# Patient Record
Sex: FEMALE | Race: WHITE | NOT HISPANIC OR LATINO | Employment: FULL TIME | URBAN - METROPOLITAN AREA
[De-identification: names, ages, dates, MRNs, and addresses within clinical notes are randomized per-mention and may not be internally consistent; named-entity substitution may affect disease eponyms.]

---

## 2018-02-13 ENCOUNTER — OFFICE VISIT (OUTPATIENT)
Dept: OBSTETRICS AND GYNECOLOGY | Age: 48
End: 2018-02-13

## 2018-02-13 VITALS
DIASTOLIC BLOOD PRESSURE: 72 MMHG | BODY MASS INDEX: 31.45 KG/M2 | HEIGHT: 59 IN | WEIGHT: 156 LBS | SYSTOLIC BLOOD PRESSURE: 120 MMHG

## 2018-02-13 DIAGNOSIS — Z01.419 ENCOUNTER FOR GYNECOLOGICAL EXAMINATION: Primary | ICD-10-CM

## 2018-02-13 DIAGNOSIS — F41.9 ANXIETY: ICD-10-CM

## 2018-02-13 DIAGNOSIS — Z98.890 S/P ENDOMETRIAL ABLATION: ICD-10-CM

## 2018-02-13 PROCEDURE — 99396 PREV VISIT EST AGE 40-64: CPT | Performed by: OBSTETRICS & GYNECOLOGY

## 2018-02-13 NOTE — PROGRESS NOTES
"Subjective     Chief Complaint   Patient presents with   • Gynecologic Exam     Annual:mammo here today         History of Present Illness      Heidy Tesfaye is a very pleasant  47 y.o. female who presents for annual exam.  Mammo Exam Today, Contraception none, Exercise 3 times a week    Patient has a history of anxiety but she's been off her medications for a while. She is under a little increased stress because she recently unexpectedly lost her mother after some complications of heart surgery. She is still exercising regularly working full-time and very active in her daughters lives. She is getting mammogram Pap smear and pelvic and breast exam here today.      Obstetric History:  OB History      Para Term  AB Living    2 2 2       SAB TAB Ectopic Multiple Live Births                 Menstrual History:     Patient's last menstrual period was 2018.       Sexual History:       Past Medical History:   Diagnosis Date   • Hyperglycemia    • Irregular menses    • LGSIL on Pap smear of cervix     1998     Past Surgical History:   Procedure Laterality Date   • CRYOTHERAPY     • ENDOMETRIAL ABLATION         SOCIAL Hx:      The following portions of the patient's history were reviewed and updated as appropriate: allergies, current medications, past family history, past medical history, past social history, past surgical history and problem list.    Review of Systems        Except as outlined in history of physical illness, patient denies any changes in her GYN, , GI systems.  All other systems reviewed were negative.         Objective   Physical Exam    /72  Ht 149.9 cm (59\")  Wt 70.8 kg (156 lb)  LMP 2018  BMI 31.51 kg/m2    General: Patient is alert and oriented and appears overall healthy  Neck: Is supple without thyromegaly, no carotid bruits and no lymphadenopathy  Lungs: Clear bilaterally, no wheezing, rhonchi, or rales.  Respiratory rate is normal  Breast: Even, " symmetrical, no lymphadenopathy, no retraction, no masses or cysts  Heart: Regular rate and rhythm are appreciated, no murmurs or rubs are heard  Abdomen: Is soft, without organomegaly, bowel sounds are positive, there is no                                rebound or guarding and palpation does not produce any discomfort  Back: Nontender without CVA tenderness  Pelvic: External genitalia appear normal and consistent with mature female.  BUS normal                            Vagina is clean dry without discharge and appears adequately estrogenized, no               lesions or masses are present                         Cervix is noninflamed without discharge or lesions.  There is no cervical motion             tenderness.                Uterus is nonenlarged, without tenderness, and no masses or abnormalities are  present               Adnexa are non-enlarged, non tender               Rectal exam reveals adequate sphincter tone and no masses or lesions are                     appreciated on digital rectal examination.    Patient Active Problem List   Diagnosis   • Vitamin D deficiency, unspecified   • S/P endometrial ablation   • Anxiety                 Assessment/Plan   Heidy was seen today for gynecologic exam.    Diagnoses and all orders for this visit:    Encounter for gynecological examination  -     IGP, Apt HPV,rfx 16 / 18,45 - ThinPrep Vial, Cervix    S/P endometrial ablation    Anxiety  Patient wants to stay off daily medicine but has been significantly sleep deprived since her mother's death. We're getting to give her a couple weeks worth of Ambien 5 mg to help sleep she'll take those once or twice a week.      Annual Well Woman Exam    Discussed today's findings and concerns with patient in detail.  Continue to recommend regular exercise to include cardiovascular and resistance training and breast self-exam. Wellness lab, mammography, and pap smear, in accordance with age guidelines.  Nutritional   recommendations  were discussed.    All of the patient's questions were addressed and answered, I have encouraged her to call for today's test results if she has not received them within 10 days.  Patient is advised to call with any change in her condition or with any other questions, otherwise return in 12 months for annual examination.

## 2018-02-15 LAB
CYTOLOGIST CVX/VAG CYTO: NORMAL
CYTOLOGY CVX/VAG DOC THIN PREP: NORMAL
DX ICD CODE: NORMAL
HIV 1 & 2 AB SER-IMP: NORMAL
HPV I/H RISK 4 DNA CVX QL PROBE+SIG AMP: NEGATIVE
OTHER STN SPEC: NORMAL
PATH REPORT.FINAL DX SPEC: NORMAL
STAT OF ADQ CVX/VAG CYTO-IMP: NORMAL

## 2019-02-19 ENCOUNTER — APPOINTMENT (OUTPATIENT)
Dept: WOMENS IMAGING | Facility: HOSPITAL | Age: 49
End: 2019-02-19

## 2019-02-19 ENCOUNTER — PROCEDURE VISIT (OUTPATIENT)
Dept: OBSTETRICS AND GYNECOLOGY | Age: 49
End: 2019-02-19

## 2019-02-19 ENCOUNTER — OFFICE VISIT (OUTPATIENT)
Dept: OBSTETRICS AND GYNECOLOGY | Age: 49
End: 2019-02-19

## 2019-02-19 VITALS
SYSTOLIC BLOOD PRESSURE: 124 MMHG | WEIGHT: 161 LBS | BODY MASS INDEX: 31.61 KG/M2 | HEIGHT: 60 IN | DIASTOLIC BLOOD PRESSURE: 70 MMHG

## 2019-02-19 DIAGNOSIS — Z98.890 S/P ENDOMETRIAL ABLATION: ICD-10-CM

## 2019-02-19 DIAGNOSIS — N95.1 PERIMENOPAUSAL: ICD-10-CM

## 2019-02-19 DIAGNOSIS — Z12.31 VISIT FOR SCREENING MAMMOGRAM: Primary | ICD-10-CM

## 2019-02-19 DIAGNOSIS — Z01.419 ENCOUNTER FOR GYNECOLOGICAL EXAMINATION: Primary | ICD-10-CM

## 2019-02-19 PROCEDURE — 99396 PREV VISIT EST AGE 40-64: CPT | Performed by: OBSTETRICS & GYNECOLOGY

## 2019-02-19 PROCEDURE — 77067 SCR MAMMO BI INCL CAD: CPT | Performed by: OBSTETRICS & GYNECOLOGY

## 2019-02-19 PROCEDURE — 77067 SCR MAMMO BI INCL CAD: CPT | Performed by: RADIOLOGY

## 2019-02-19 NOTE — PROGRESS NOTES
"Subjective     Chief Complaint   Patient presents with   • Gynecologic Exam     Annual:last pap 2018         History of Present Illness      Heidy Tesfaye is a very pleasant  48 y.o. female who presents for annual exam.  Mammo Exam today, Contraception none, Exercise 5 times a week  Patient had an endometrial ablation, her cycles are 50% less than what they used to be but still 2-3 days.  No gynecological concerns or complaints.  She has had a little bit better year she is doing better emotionally after the loss of her mother.  Family life and work is still stressful.  She is a non-smoker.  She is receiving mammogram and Pap smear today..      Obstetric History:  OB History      Para Term  AB Living    2 2 2          SAB TAB Ectopic Molar Multiple Live Births                        Menstrual History:     Patient's last menstrual period was 2019 (approximate).       Sexual History:       Past Medical History:   Diagnosis Date   • Hyperglycemia    • Irregular menses    • LGSIL on Pap smear of cervix     1998     Past Surgical History:   Procedure Laterality Date   • CRYOTHERAPY     • ENDOMETRIAL ABLATION         SOCIAL Hx:      The following portions of the patient's history were reviewed and updated as appropriate: allergies, current medications, past family history, past medical history, past social history, past surgical history and problem list.    Review of Systems        Except as outlined in history of physical illness, patient denies any changes in her GYN, , GI systems.  All other systems reviewed were negative.         Objective   Physical Exam    /70   Ht 152.4 cm (60\")   Wt 73 kg (161 lb)   LMP 2019 (Approximate)   BMI 31.44 kg/m²     General: Patient is alert and oriented and appears overall healthy  Neck: Is supple without thyromegaly, no carotid bruits and no lymphadenopathy  Lungs: Clear bilaterally, no wheezing, rhonchi, or rales.  Respiratory rate is " normal  Breast: Even, symmetrical, no lymphadenopathy, no retraction, no masses or cysts  Heart: Regular rate and rhythm are appreciated, no murmurs or rubs are heard  Abdomen: Is soft, without organomegaly, bowel sounds are positive, there is no                                rebound or guarding and palpation does not produce any discomfort  Back: Nontender without CVA tenderness  Pelvic: External genitalia appear normal and consistent with mature female.  BUS normal                            Vagina is clean dry without discharge and appears adequately estrogenized, no               lesions or masses are present                         Cervix is noninflamed without discharge or lesions.  There is no cervical motion             tenderness.                Uterus is nonenlarged, without tenderness, and no masses or abnormalities are  present               Adnexa are non-enlarged, non tender               Rectal exam reveals adequate sphincter tone and no masses or lesions are                     appreciated on digital rectal examination.      Annual Well Woman Exam  Patient Active Problem List   Diagnosis   • Vitamin D deficiency, unspecified   • S/P endometrial ablation   • Anxiety                 Assessment/Plan   Heidy was seen today for gynecologic exam.    Diagnoses and all orders for this visit:    Encounter for gynecological examination  -     IGP, Apt HPV,rfx 16 / 18,45    Perimenopausal    S/P endometrial ablation    Patient would like to have wellness labs next year        Discussed today's findings and concerns with patient in detail.  Continue to recommend regular exercise to include cardiovascular and resistance training and breast self-exam. Wellness lab, mammography, and pap smear, in accordance with age guidelines.  Nutritional  recommendations  were discussed.    All of the patient's questions were addressed and answered, I have encouraged her to call for today's test results if she has not received  them within 10 days.  Patient is advised to call with any change in her condition or with any other questions, otherwise return in 12 months for annual examination.

## 2019-06-10 ENCOUNTER — TELEPHONE (OUTPATIENT)
Dept: OBSTETRICS AND GYNECOLOGY | Age: 49
End: 2019-06-10

## 2019-06-10 NOTE — TELEPHONE ENCOUNTER
Please advise    Notify patient that I have sent some medication to the pharmacy that she should take for 10 days and this might help with the sweats

## 2019-06-10 NOTE — TELEPHONE ENCOUNTER
Dr Rome pt has been experiencing really bad day and night sweats since her Feb visit. Pt wants to know what she should do. Pt will be traveling to [place] this Sat and doesn't want to be sweating all day if there is something she can do. Please Advise

## 2019-06-14 ENCOUNTER — TELEPHONE (OUTPATIENT)
Dept: OBSTETRICS AND GYNECOLOGY | Age: 49
End: 2019-06-14

## 2019-06-14 NOTE — TELEPHONE ENCOUNTER
SPOKE TO PT, SHE WAS WONDERING IF THERE WAS ANYTHING OTC SHE CAN TAKE TO HELP WITH THE DAY/NIGHT SWEATS UNTIL MED STARTS TAKING EFFECT.

## 2019-06-14 NOTE — TELEPHONE ENCOUNTER
Take for 10 days as advised by Dr. Rome. This medication was just prescribed 4 days ago- may take more time before she notices benefits. If no improvement in symptoms she may consider f/u with Dr. Rome.

## 2019-06-14 NOTE — TELEPHONE ENCOUNTER
PT CALLING, CONFUSED ON HOW TO TAKE PROGESTERONE. DR ANDRADE TOLD HER IT WOULD BE 10 PILLS BUT THE BOTTLE HAS 14 PILLS. STILL NO RELIEF WITH THE DAY OR NIGHT SWEATS. GETTING READY TO GO OUT OF TOWN FOR THE NEXT 10 DAYS. [phone]

## 2020-02-25 ENCOUNTER — OFFICE VISIT (OUTPATIENT)
Dept: OBSTETRICS AND GYNECOLOGY | Age: 50
End: 2020-02-25

## 2020-02-25 VITALS
DIASTOLIC BLOOD PRESSURE: 78 MMHG | HEIGHT: 60 IN | BODY MASS INDEX: 31.61 KG/M2 | WEIGHT: 161 LBS | SYSTOLIC BLOOD PRESSURE: 124 MMHG

## 2020-02-25 DIAGNOSIS — N95.1 HOT FLASHES DUE TO MENOPAUSE: ICD-10-CM

## 2020-02-25 DIAGNOSIS — Z12.4 PAP SMEAR FOR CERVICAL CANCER SCREENING: ICD-10-CM

## 2020-02-25 DIAGNOSIS — Z01.419 WELL FEMALE EXAM WITH ROUTINE GYNECOLOGICAL EXAM: ICD-10-CM

## 2020-02-25 DIAGNOSIS — E55.9 VITAMIN D DEFICIENCY: Primary | ICD-10-CM

## 2020-02-25 DIAGNOSIS — Z71.89 COUNSELING FOR HORMONE REPLACEMENT THERAPY: ICD-10-CM

## 2020-02-25 PROCEDURE — 99396 PREV VISIT EST AGE 40-64: CPT | Performed by: OBSTETRICS & GYNECOLOGY

## 2020-02-25 NOTE — PROGRESS NOTES
"Subjective     Chief Complaint   Patient presents with   • Gynecologic Exam     Annual.  Last pap 19, negative. Last mammo 19, negative.          History of Present Illness      Heidy Tesfaye is a very pleasant  49 y.o. female who presents for annual exam.  Mammo Exam has to be rescheduled from this morning, Contraception none, Exercise 5 times a week    Patient is bothered by vasomotor changes mostly hot flashes.  She has had an endometrial ablation and wants to know if she is menopausal to decide if she wants to go on some kind of HRT    She has not had a wellness labs work and a recheck those this morning    She has had a stressful year her mom had passed, then her father-in-law recently , and they have had some family friends who were killed in accidents.  She states she is coping appropriately.      Obstetric History:  OB History        2    Para   2    Term   2            AB        Living           SAB        TAB        Ectopic        Molar        Multiple        Live Births                   Menstrual History:     Patient's last menstrual period was 2019 (approximate).       Sexual History:       Past Medical History:   Diagnosis Date   • Hyperglycemia    • Irregular menses    • LGSIL on Pap smear of cervix     1998     Past Surgical History:   Procedure Laterality Date   • CRYOTHERAPY     • ENDOMETRIAL ABLATION         SOCIAL Hx:      The following portions of the patient's history were reviewed and updated as appropriate: allergies, current medications, past family history, past medical history, past social history, past surgical history and problem list.    Review of Systems        Except as outlined in history of physical illness, patient denies any changes in her GYN, , GI systems.  All other systems reviewed were negative.         Objective   Physical Exam    /78   Ht 152.4 cm (60\")   Wt 73 kg (161 lb)   LMP 2019 (Approximate)   Breastfeeding No  "  BMI 31.44 kg/m²     General: Patient is alert and oriented and appears overall healthy  Neck: Is supple without thyromegaly, no carotid bruits and no lymphadenopathy  Lungs: Clear bilaterally, no wheezing, rhonchi, or rales.  Respiratory rate is normal  Breast: Even, symmetrical, no lymphadenopathy, no retraction, no masses or cysts  Heart: Regular rate and rhythm are appreciated, no murmurs or rubs are heard  Abdomen: Is soft, without organomegaly, bowel sounds are positive, there is no                                rebound or guarding and palpation does not produce any discomfort  Back: Nontender without CVA tenderness  Pelvic: External genitalia appear normal and consistent with mature female.  BUS normal                            Vagina is clean dry without discharge and appears adequately estrogenized, no               lesions or masses are present                         Cervix is noninflamed without discharge or lesions.  There is no cervical motion             tenderness.                Uterus is nonenlarged, without tenderness, and no masses or abnormalities are  present               Adnexa are non-enlarged, non tender               Rectal exam reveals adequate sphincter tone and no masses or lesions are                     appreciated on digital rectal examination.      Annual Well Woman Exam  Patient Active Problem List   Diagnosis   • Vitamin D deficiency, unspecified   • S/P endometrial ablation   • Anxiety                 Assessment/Plan   Heidy was seen today for gynecologic exam.    Diagnoses and all orders for this visit:    Vitamin D deficiency, unspecified  -     Follicle Stimulating Hormone  -     CBC (No Diff)  -     Comprehensive Metabolic Panel  -     Thyroid Cascade Profile  -     Lipid Panel  -     Vitamin D 25 Hydroxy    Counseling for hormone replacement therapy  -     Follicle Stimulating Hormone  -     CBC (No Diff)  -     Comprehensive Metabolic Panel  -     Thyroid Cascade  Profile  -     Lipid Panel  -     Vitamin D 25 Hydroxy    Hot flashes due to menopause  -     Follicle Stimulating Hormone    If patient is menopausal she might consider going on hormone replacement therapy, if she is not menopausal might be interested in something to help her symptoms of hot flashes.  She is continually worried about any medicines and its effect on weight gain  Discussed today's findings and concerns with patient.  Continue to recommend regular exercise including cardiovascular and resistance training as well as  breast self-exam. Wellness lab, mammography, & pap smear, in accordance with age guidelines.    I have encouraged her to call for today's test results if she has not received them within 10 days.  Patient is advised to call with any change in her condition or with any other questions, otherwise return  for annual examination.

## 2020-02-26 LAB
25(OH)D3+25(OH)D2 SERPL-MCNC: 15.5 NG/ML (ref 30–100)
ALBUMIN SERPL-MCNC: 4.8 G/DL (ref 3.5–5.2)
ALBUMIN/GLOB SERPL: 1.8 G/DL
ALP SERPL-CCNC: 109 U/L (ref 39–117)
ALT SERPL-CCNC: 26 U/L (ref 1–33)
AST SERPL-CCNC: 15 U/L (ref 1–32)
BILIRUB SERPL-MCNC: 0.5 MG/DL (ref 0.2–1.2)
BUN SERPL-MCNC: 14 MG/DL (ref 6–20)
BUN/CREAT SERPL: 17.1 (ref 7–25)
CALCIUM SERPL-MCNC: 9.9 MG/DL (ref 8.6–10.5)
CHLORIDE SERPL-SCNC: 101 MMOL/L (ref 98–107)
CHOLEST SERPL-MCNC: 184 MG/DL (ref 0–200)
CO2 SERPL-SCNC: 23.7 MMOL/L (ref 22–29)
CREAT SERPL-MCNC: 0.82 MG/DL (ref 0.57–1)
ERYTHROCYTE [DISTWIDTH] IN BLOOD BY AUTOMATED COUNT: 12.8 % (ref 12.3–15.4)
FSH SERPL-ACNC: 46.5 MIU/ML
GLOBULIN SER CALC-MCNC: 2.7 GM/DL
GLUCOSE SERPL-MCNC: 97 MG/DL (ref 65–99)
HCT VFR BLD AUTO: 40 % (ref 34–46.6)
HDLC SERPL-MCNC: 31 MG/DL (ref 40–60)
HGB BLD-MCNC: 13.8 G/DL (ref 12–15.9)
LDLC SERPL CALC-MCNC: 97 MG/DL (ref 0–100)
MCH RBC QN AUTO: 29.9 PG (ref 26.6–33)
MCHC RBC AUTO-ENTMCNC: 34.5 G/DL (ref 31.5–35.7)
MCV RBC AUTO: 86.8 FL (ref 79–97)
PLATELET # BLD AUTO: 359 10*3/MM3 (ref 140–450)
POTASSIUM SERPL-SCNC: 4.7 MMOL/L (ref 3.5–5.2)
PROT SERPL-MCNC: 7.5 G/DL (ref 6–8.5)
RBC # BLD AUTO: 4.61 10*6/MM3 (ref 3.77–5.28)
SODIUM SERPL-SCNC: 140 MMOL/L (ref 136–145)
TRIGL SERPL-MCNC: 281 MG/DL (ref 0–150)
TSH SERPL DL<=0.005 MIU/L-ACNC: 1.75 UIU/ML (ref 0.45–4.5)
VLDLC SERPL CALC-MCNC: 56.2 MG/DL
WBC # BLD AUTO: 8.15 10*3/MM3 (ref 3.4–10.8)

## 2020-02-26 RX ORDER — ERGOCALCIFEROL 1.25 MG/1
50000 CAPSULE ORAL DAILY
Qty: 12 CAPSULE | Refills: 3 | Status: SHIPPED | OUTPATIENT
Start: 2020-02-26 | End: 2020-02-28 | Stop reason: SDUPTHER

## 2020-02-27 ENCOUNTER — APPOINTMENT (OUTPATIENT)
Dept: WOMENS IMAGING | Facility: HOSPITAL | Age: 50
End: 2020-02-27

## 2020-02-27 ENCOUNTER — TELEPHONE (OUTPATIENT)
Dept: OBSTETRICS AND GYNECOLOGY | Age: 50
End: 2020-02-27

## 2020-02-27 ENCOUNTER — PROCEDURE VISIT (OUTPATIENT)
Dept: OBSTETRICS AND GYNECOLOGY | Age: 50
End: 2020-02-27

## 2020-02-27 DIAGNOSIS — Z12.31 VISIT FOR SCREENING MAMMOGRAM: Primary | ICD-10-CM

## 2020-02-27 LAB
CYTOLOGIST CVX/VAG CYTO: NORMAL
CYTOLOGY CVX/VAG DOC CYTO: NORMAL
CYTOLOGY CVX/VAG DOC THIN PREP: NORMAL
DX ICD CODE: NORMAL
HIV 1 & 2 AB SER-IMP: NORMAL
HPV I/H RISK 4 DNA CVX QL PROBE+SIG AMP: NEGATIVE
Lab: NORMAL
OTHER STN SPEC: NORMAL
STAT OF ADQ CVX/VAG CYTO-IMP: NORMAL

## 2020-02-27 PROCEDURE — 77067 SCR MAMMO BI INCL CAD: CPT | Performed by: RADIOLOGY

## 2020-02-27 PROCEDURE — 77067 SCR MAMMO BI INCL CAD: CPT | Performed by: OBSTETRICS & GYNECOLOGY

## 2020-02-27 RX ORDER — ESTRADIOL AND NORETHINDRONE ACETATE 1; .5 MG/1; MG/1
1 TABLET ORAL DAILY
Qty: 90 TABLET | Refills: 3 | Status: SHIPPED | OUTPATIENT
Start: 2020-02-27 | End: 2020-05-20 | Stop reason: SDUPTHER

## 2020-02-27 NOTE — TELEPHONE ENCOUNTER
Pt notified.     She is also wondering about her hormones and whether she is in menopause or not?  I informed her that dr richard would not be back in the office until Monday.

## 2020-02-27 NOTE — TELEPHONE ENCOUNTER
----- Message from Keegan Rome MD sent at 2/26/2020  3:18 PM EST -----  Please call the patient regarding her abnormal result.  Please let Heidy know that her vitamin D is a low I want her to take vitamin D its a weekly tablet of 50,000, I want her to do that for 3 months come back into our office and recheck a vitamin D level to see if we have gotten her into the normal range.  She could also follow-up with her PCP in 3 months if she prefers and she should run the fact that her LDL is still elevated by her PCP otherwise the labs look good

## 2020-02-27 NOTE — TELEPHONE ENCOUNTER
Please let patient know that that hormone level was just returned and it does appear that she is menopausal.  As we discussed in the office I think given all of her symptoms it would be a good idea for a trial of HRT.  I have tentatively sent that prescription to the pharmacy and she can pick that up and start it at any time.  She should notice some significant relief within 3 to 4 weeks.

## 2020-02-28 RX ORDER — ERGOCALCIFEROL 1.25 MG/1
50000 CAPSULE ORAL
Qty: 12 CAPSULE | Refills: 3 | Status: SHIPPED | OUTPATIENT
Start: 2020-02-28 | End: 2021-03-02 | Stop reason: SDUPTHER

## 2020-02-28 NOTE — TELEPHONE ENCOUNTER
Pharmacy sent a fax stating the original rx for vitamin d should be 50,000 iu should be weekly not daily. We need to resubmit rx correctly.

## 2020-03-02 ENCOUNTER — TELEPHONE (OUTPATIENT)
Dept: OBSTETRICS AND GYNECOLOGY | Age: 50
End: 2020-03-02

## 2020-03-02 NOTE — TELEPHONE ENCOUNTER
----- Message from Keegan Rome MD sent at 3/2/2020  9:11 AM EST -----  Patient should be aware but they need further imaging studies, orders have already been entered

## 2020-03-03 DIAGNOSIS — R92.8 ABNORMAL MAMMOGRAM: Primary | ICD-10-CM

## 2020-03-04 ENCOUNTER — TELEPHONE (OUTPATIENT)
Dept: OBSTETRICS AND GYNECOLOGY | Age: 50
End: 2020-03-04

## 2020-03-04 NOTE — TELEPHONE ENCOUNTER
Patient has not received a call from [facility] scheduling in regards to her follow up views from her mammogram.

## 2020-03-04 NOTE — TELEPHONE ENCOUNTER
We sent the referral yesterday morning to [facility] (unless dr or patient requests a different facility we send to [facility] because those are the radiologists who read our screenings).      [facility] schedulers call the patient within 3 days.  If she does not want to wait for their call, she can call [facility] [phone], or [facility] [phone].

## 2020-03-16 ENCOUNTER — APPOINTMENT (OUTPATIENT)
Dept: WOMENS IMAGING | Facility: HOSPITAL | Age: 50
End: 2020-03-16

## 2020-03-16 PROCEDURE — 76641 ULTRASOUND BREAST COMPLETE: CPT | Performed by: RADIOLOGY

## 2020-03-16 PROCEDURE — 77061 BREAST TOMOSYNTHESIS UNI: CPT | Performed by: RADIOLOGY

## 2020-03-16 PROCEDURE — 77065 DX MAMMO INCL CAD UNI: CPT | Performed by: RADIOLOGY

## 2020-03-16 PROCEDURE — G0279 TOMOSYNTHESIS, MAMMO: HCPCS | Performed by: RADIOLOGY

## 2020-05-20 ENCOUNTER — TELEPHONE (OUTPATIENT)
Dept: OBSTETRICS AND GYNECOLOGY | Age: 50
End: 2020-05-20

## 2020-05-20 RX ORDER — ESTRADIOL AND NORETHINDRONE ACETATE 1; .5 MG/1; MG/1
1 TABLET ORAL DAILY
Qty: 90 TABLET | Refills: 2 | Status: SHIPPED | OUTPATIENT
Start: 2020-05-20 | End: 2020-05-28 | Stop reason: SDUPTHER

## 2020-05-27 ENCOUNTER — TELEPHONE (OUTPATIENT)
Dept: OBSTETRICS AND GYNECOLOGY | Age: 50
End: 2020-05-27

## 2020-05-27 NOTE — TELEPHONE ENCOUNTER
Pt called Rx for estradiol that were sent to her pharmacy  needs to  Be done by mail order now.  Pt is requesting her prescription be resent to [facility].  Pt has been out of her medication for a week now.  Please advise.     [facility] - [address] [phone] [facility] [phone] FX

## 2020-05-28 RX ORDER — ESTRADIOL AND NORETHINDRONE ACETATE 1; .5 MG/1; MG/1
1 TABLET ORAL DAILY
Qty: 90 TABLET | Refills: 2 | Status: SHIPPED | OUTPATIENT
Start: 2020-05-28 | End: 2021-01-04 | Stop reason: SDUPTHER

## 2020-12-03 ENCOUNTER — OFFICE (OUTPATIENT)
Dept: URBAN - METROPOLITAN AREA CLINIC 75 | Facility: CLINIC | Age: 50
End: 2020-12-03

## 2020-12-03 VITALS — WEIGHT: 162 LBS | TEMPERATURE: 96.3 F | HEART RATE: 72 BPM | OXYGEN SATURATION: 99 % | HEIGHT: 60 IN

## 2020-12-03 DIAGNOSIS — K21.9 GASTRO-ESOPHAGEAL REFLUX DISEASE WITHOUT ESOPHAGITIS: ICD-10-CM

## 2020-12-03 DIAGNOSIS — R10.10 UPPER ABDOMINAL PAIN, UNSPECIFIED: ICD-10-CM

## 2020-12-03 DIAGNOSIS — R19.7 DIARRHEA, UNSPECIFIED: ICD-10-CM

## 2020-12-03 PROCEDURE — 99204 OFFICE O/P NEW MOD 45 MIN: CPT | Performed by: INTERNAL MEDICINE

## 2020-12-30 VITALS
HEART RATE: 81 BPM | HEART RATE: 93 BPM | RESPIRATION RATE: 14 BRPM | HEART RATE: 91 BPM | DIASTOLIC BLOOD PRESSURE: 62 MMHG | DIASTOLIC BLOOD PRESSURE: 83 MMHG | TEMPERATURE: 97.3 F | DIASTOLIC BLOOD PRESSURE: 63 MMHG | RESPIRATION RATE: 15 BRPM | DIASTOLIC BLOOD PRESSURE: 60 MMHG | OXYGEN SATURATION: 98 % | DIASTOLIC BLOOD PRESSURE: 78 MMHG | DIASTOLIC BLOOD PRESSURE: 58 MMHG | OXYGEN SATURATION: 96 % | OXYGEN SATURATION: 97 % | SYSTOLIC BLOOD PRESSURE: 119 MMHG | RESPIRATION RATE: 20 BRPM | RESPIRATION RATE: 16 BRPM | SYSTOLIC BLOOD PRESSURE: 101 MMHG | DIASTOLIC BLOOD PRESSURE: 53 MMHG | SYSTOLIC BLOOD PRESSURE: 129 MMHG | WEIGHT: 162 LBS | OXYGEN SATURATION: 99 % | SYSTOLIC BLOOD PRESSURE: 135 MMHG | SYSTOLIC BLOOD PRESSURE: 114 MMHG | HEART RATE: 65 BPM | HEART RATE: 85 BPM | SYSTOLIC BLOOD PRESSURE: 121 MMHG | TEMPERATURE: 97.2 F | HEART RATE: 70 BPM | SYSTOLIC BLOOD PRESSURE: 132 MMHG | DIASTOLIC BLOOD PRESSURE: 57 MMHG | HEIGHT: 60 IN | SYSTOLIC BLOOD PRESSURE: 143 MMHG | OXYGEN SATURATION: 100 % | RESPIRATION RATE: 12 BRPM | HEART RATE: 88 BPM | HEART RATE: 97 BPM | HEART RATE: 79 BPM | DIASTOLIC BLOOD PRESSURE: 76 MMHG | HEART RATE: 78 BPM | SYSTOLIC BLOOD PRESSURE: 113 MMHG

## 2021-01-04 ENCOUNTER — TELEPHONE (OUTPATIENT)
Dept: OBSTETRICS AND GYNECOLOGY | Age: 51
End: 2021-01-04

## 2021-01-04 RX ORDER — ESTRADIOL AND NORETHINDRONE ACETATE 1; .5 MG/1; MG/1
1 TABLET ORAL DAILY
Qty: 90 TABLET | Refills: 2 | Status: SHIPPED | OUTPATIENT
Start: 2021-01-04 | End: 2021-04-21 | Stop reason: SDUPTHER

## 2021-01-04 NOTE — TELEPHONE ENCOUNTER
(Link pt) Pt called, made annual appt for 4/21 @ 10:00 but needs refill of her estradiol-norethindrone (Activella) 1-0.5 MG per tablet.   Can you give 3 refills?    Pharmacy verified.    [phone]

## 2021-01-06 ENCOUNTER — OFFICE (OUTPATIENT)
Dept: URBAN - METROPOLITAN AREA PATHOLOGY 4 | Facility: PATHOLOGY | Age: 51
End: 2021-01-06
Payer: COMMERCIAL

## 2021-01-06 ENCOUNTER — AMBULATORY SURGICAL CENTER (OUTPATIENT)
Dept: URBAN - METROPOLITAN AREA SURGERY 17 | Facility: SURGERY | Age: 51
End: 2021-01-06
Payer: COMMERCIAL

## 2021-01-06 DIAGNOSIS — D12.2 BENIGN NEOPLASM OF ASCENDING COLON: ICD-10-CM

## 2021-01-06 DIAGNOSIS — R10.10 UPPER ABDOMINAL PAIN, UNSPECIFIED: ICD-10-CM

## 2021-01-06 DIAGNOSIS — D12.5 BENIGN NEOPLASM OF SIGMOID COLON: ICD-10-CM

## 2021-01-06 DIAGNOSIS — R19.7 DIARRHEA, UNSPECIFIED: ICD-10-CM

## 2021-01-06 DIAGNOSIS — K21.9 GASTRO-ESOPHAGEAL REFLUX DISEASE WITHOUT ESOPHAGITIS: ICD-10-CM

## 2021-01-06 DIAGNOSIS — K52.9 NONINFECTIVE GASTROENTERITIS AND COLITIS, UNSPECIFIED: ICD-10-CM

## 2021-01-06 PROBLEM — K63.89 OTHER SPECIFIED DISEASES OF INTESTINE: Status: ACTIVE | Noted: 2021-01-06

## 2021-01-06 PROBLEM — K63.5 POLYP OF COLON: Status: ACTIVE | Noted: 2021-01-06

## 2021-01-06 LAB
GI HISTOLOGY: A. UNSPECIFIED: (no result)
GI HISTOLOGY: B. SELECT: (no result)
GI HISTOLOGY: C. SELECT: (no result)
GI HISTOLOGY: D. SELECT: (no result)
GI HISTOLOGY: E. UNSPECIFIED: (no result)
GI HISTOLOGY: F. UNSPECIFIED: (no result)
GI HISTOLOGY: G. UNSPECIFIED: (no result)
GI HISTOLOGY: PDF REPORT: (no result)

## 2021-01-06 PROCEDURE — 88305 TISSUE EXAM BY PATHOLOGIST: CPT | Performed by: INTERNAL MEDICINE

## 2021-01-06 PROCEDURE — 43239 EGD BIOPSY SINGLE/MULTIPLE: CPT | Performed by: INTERNAL MEDICINE

## 2021-01-06 PROCEDURE — 45380 COLONOSCOPY AND BIOPSY: CPT | Performed by: INTERNAL MEDICINE

## 2021-01-06 NOTE — SERVICENOTES
Colonoscopy is not 100% accurate and can miss certain lesions.   If symptoms or signs of colon problems arise the study may need to be repeated earlier than currently anticipated.

## 2021-01-06 NOTE — SERVICEHPINOTES
NELDA CHAN  is a  50  female   who presents today for a  EGD-Colonoscopy   for   the indications listed below. The updated Patient Profile was reviewed prior to the procedure, in conjunction with the Physical Exam, including medical conditions, surgical procedures, medications, allergies, family history and social history. See Physical Exam time stamp below for date and time of HPI completion.Pre-operatively, I reviewed the indication(s) for the procedure, the risks of the procedure [including but not limited to: unexpected bleeding possibly requiring hospitalization and/or unplanned repeat procedures, perforation possibly requiring surgical treatment, missed lesions and complications of sedation/MAC (also explained by anesthesia staff)]. I have evaluated the patient for risks associated with the planned anesthesia and the procedure to be performed and find the patient an acceptable candidate for IV sedation.Multiple opportunities were provided for any questions or concerns, and all questions were answered satisfactorily before any anesthesia was administered. We will proceed with the planned procedure.BR

## 2021-02-07 VITALS — HEIGHT: 60 IN | WEIGHT: 160 LBS | TEMPERATURE: 97.7 F

## 2021-02-09 ENCOUNTER — OFFICE (OUTPATIENT)
Dept: URBAN - METROPOLITAN AREA CLINIC 75 | Facility: CLINIC | Age: 51
End: 2021-02-09

## 2021-02-09 DIAGNOSIS — K21.9 GASTRO-ESOPHAGEAL REFLUX DISEASE WITHOUT ESOPHAGITIS: ICD-10-CM

## 2021-02-09 DIAGNOSIS — R19.7 DIARRHEA, UNSPECIFIED: ICD-10-CM

## 2021-02-09 DIAGNOSIS — K52.9 NONINFECTIVE GASTROENTERITIS AND COLITIS, UNSPECIFIED: ICD-10-CM

## 2021-02-09 PROCEDURE — 99213 OFFICE O/P EST LOW 20 MIN: CPT | Performed by: INTERNAL MEDICINE

## 2021-02-09 RX ORDER — OMEPRAZOLE 20 MG/1
CAPSULE, DELAYED RELEASE ORAL
Qty: 90 | Refills: 3 | Status: COMPLETED
Start: 2021-02-09 | End: 2021-11-15

## 2021-03-02 ENCOUNTER — TELEPHONE (OUTPATIENT)
Dept: OBSTETRICS AND GYNECOLOGY | Age: 51
End: 2021-03-02

## 2021-03-02 RX ORDER — ERGOCALCIFEROL 1.25 MG/1
50000 CAPSULE ORAL
Qty: 12 CAPSULE | Refills: 3 | Status: SHIPPED | OUTPATIENT
Start: 2021-03-02 | End: 2021-04-21 | Stop reason: SDUPTHER

## 2021-03-02 NOTE — TELEPHONE ENCOUNTER
Link pt    Pt stated that she needs a refill for her vitamin D. Pharmacy verified. Please advise    [phone]

## 2021-03-18 ENCOUNTER — OFFICE VISIT (OUTPATIENT)
Dept: FAMILY MEDICINE CLINIC | Facility: CLINIC | Age: 51
End: 2021-03-18

## 2021-03-18 VITALS
SYSTOLIC BLOOD PRESSURE: 110 MMHG | TEMPERATURE: 98.1 F | DIASTOLIC BLOOD PRESSURE: 86 MMHG | OXYGEN SATURATION: 99 % | HEIGHT: 60 IN | WEIGHT: 160.5 LBS | HEART RATE: 83 BPM | BODY MASS INDEX: 31.51 KG/M2

## 2021-03-18 DIAGNOSIS — K58.0 IRRITABLE BOWEL SYNDROME WITH DIARRHEA: Primary | ICD-10-CM

## 2021-03-18 PROBLEM — H52.13 MYOPIC ASTIGMATISM OF BOTH EYES: Status: ACTIVE | Noted: 2021-03-18

## 2021-03-18 PROBLEM — R10.9 ABDOMINAL PAIN: Status: ACTIVE | Noted: 2021-03-18

## 2021-03-18 PROBLEM — R53.83 FATIGUE: Status: ACTIVE | Noted: 2021-03-18

## 2021-03-18 PROBLEM — H52.203 MYOPIC ASTIGMATISM OF BOTH EYES: Status: ACTIVE | Noted: 2021-03-18

## 2021-03-18 PROBLEM — E07.9 DISEASE OF THYROID GLAND: Status: ACTIVE | Noted: 2021-03-18

## 2021-03-18 PROBLEM — G47.00 CANNOT SLEEP: Status: ACTIVE | Noted: 2021-03-18

## 2021-03-18 PROBLEM — H52.13 MYOPIA, BILATERAL: Status: ACTIVE | Noted: 2021-03-18

## 2021-03-18 PROBLEM — D72.829 ELEVATED WBC COUNT: Status: ACTIVE | Noted: 2021-03-18

## 2021-03-18 PROBLEM — K21.9 ACID REFLUX: Status: ACTIVE | Noted: 2021-03-18

## 2021-03-18 PROBLEM — K58.9 ADAPTIVE COLITIS: Status: ACTIVE | Noted: 2021-03-18

## 2021-03-18 PROCEDURE — 99213 OFFICE O/P EST LOW 20 MIN: CPT | Performed by: NURSE PRACTITIONER

## 2021-03-18 RX ORDER — OMEPRAZOLE 20 MG/1
CAPSULE, DELAYED RELEASE ORAL
COMMUNITY
Start: 2021-02-09 | End: 2021-03-18

## 2021-03-18 RX ORDER — HYDROXYZINE PAMOATE 25 MG/1
25 CAPSULE ORAL
COMMUNITY
Start: 2020-11-09 | End: 2021-03-18

## 2021-03-18 RX ORDER — VENLAFAXINE HYDROCHLORIDE 75 MG/1
75 CAPSULE, EXTENDED RELEASE ORAL DAILY
COMMUNITY
Start: 2020-11-09 | End: 2021-03-18

## 2021-03-18 RX ORDER — POLYETHYLENE GLYCOL 3350, SODIUM SULFATE, SODIUM CHLORIDE, POTASSIUM CHLORIDE, ASCORBIC ACID, SODIUM ASCORBATE 140-9-5.2G
KIT ORAL
COMMUNITY
Start: 2020-12-16 | End: 2021-03-18

## 2021-03-18 RX ORDER — DICYCLOMINE HCL 20 MG
20 TABLET ORAL 4 TIMES DAILY
COMMUNITY
Start: 2020-11-09 | End: 2021-03-18

## 2021-03-18 RX ORDER — AMITRIPTYLINE HYDROCHLORIDE 25 MG/1
25 TABLET, FILM COATED ORAL NIGHTLY
Qty: 30 TABLET | Refills: 3 | Status: SHIPPED | OUTPATIENT
Start: 2021-03-18

## 2021-03-18 NOTE — PROGRESS NOTES
"Preventive Exam    History of Present Illness: Heidy Tesfaye is a 50 y.o. here for c/o abdominal pain. She reports that she has had a colonoscopy recently on 1/6/2021. She did have colon polyps.  She still reports she is having lose stools and cramping. Patient reports that she under stress. She reports that she was prescribed bentyl and that this is not helping.  She is taking immodium, which she states helps with the diarrhea. Patient reports that she does have some reflux and intermittent nausea. Patient reports that she takes over the counter generic tagament, which she states does \"help some\". Patient has had a decrease in appetite.     Past medical history, surgical history and family history have been reviewed.       Review of Systems   Constitutional: Positive for appetite change. Negative for chills, fatigue and fever.   Respiratory: Negative for cough and shortness of breath.    Cardiovascular: Negative for chest pain, palpitations and leg swelling.   Gastrointestinal: Positive for abdominal pain, diarrhea and nausea. Negative for abdominal distention, anal bleeding, blood in stool, constipation, rectal pain, vomiting, GERD and indigestion.   Genitourinary: Negative.  Negative for flank pain.   Neurological: Negative for dizziness and headache.       PHYSICAL EXAM    Vitals:    03/18/21 0945   BP: 110/86   Pulse: 83   Temp: 98.1 °F (36.7 °C)   SpO2: 99%     Body mass index is 31.35 kg/m².        Physical Exam  Vitals and nursing note reviewed.   Constitutional:       Appearance: She is well-developed.   HENT:      Head: Normocephalic and atraumatic.   Eyes:      Conjunctiva/sclera: Conjunctivae normal.      Pupils: Pupils are equal, round, and reactive to light.   Neck:      Thyroid: No thyromegaly.   Cardiovascular:      Rate and Rhythm: Normal rate and regular rhythm.      Heart sounds: Normal heart sounds. No murmur heard.     Pulmonary:      Effort: Pulmonary effort is normal.      Breath sounds: " Normal breath sounds.   Abdominal:      General: Bowel sounds are normal. There is no distension.      Palpations: Abdomen is soft. There is no mass.      Tenderness: There is abdominal tenderness. There is no right CVA tenderness, left CVA tenderness, guarding or rebound.      Hernia: No hernia is present.      Comments: Left and right lower quadrant tenderness with palpation.    Musculoskeletal:      Cervical back: Normal range of motion and neck supple.   Lymphadenopathy:      Cervical: No cervical adenopathy.   Skin:     General: Skin is warm and dry.   Neurological:      Mental Status: She is alert and oriented to person, place, and time.   Psychiatric:         Behavior: Behavior normal.         Thought Content: Thought content normal.         Judgment: Judgment normal.         Procedures    Diagnoses and all orders for this visit:    1. Irritable bowel syndrome with diarrhea (Primary)  -     amitriptyline (ELAVIL) 25 MG tablet; Take 1 tablet by mouth Every Night.  Dispense: 30 tablet; Refill: 3  -     Ambulatory Referral to Gastroenterology        Problems Addressed this Visit     None      Visit Diagnoses     Irritable bowel syndrome with diarrhea    -  Primary    Relevant Medications    amitriptyline (ELAVIL) 25 MG tablet    Other Relevant Orders    Ambulatory Referral to Gastroenterology (Completed)      Diagnoses       Codes Comments    Irritable bowel syndrome with diarrhea    -  Primary ICD-10-CM: K58.0  ICD-9-CM: 564.1         Routine health maintenance reviewed and discussed with Heidy Tesfaye.  Will try amitryptyline for IBS.  Patient is requesting referral to new GI physician.   Advised patient to keep food diary.  Diet for IBS recommended. Information included in AVS.    Return in about 1 month (around 4/18/2021) for Recheck IBS.

## 2021-03-18 NOTE — PATIENT INSTRUCTIONS
"Keep a food diary.  Try to avoid acidic foods or foods that irritate your stomach.  Return in about 1 month (around 4/18/2021) for Recheck IBS.    Diet for Irritable Bowel Syndrome  When you have irritable bowel syndrome (IBS), it is very important to eat the foods and follow the eating habits that are best for your condition. IBS may cause various symptoms such as pain in the abdomen, constipation, or diarrhea. Choosing the right foods can help to ease the discomfort from these symptoms. Work with your health care provider and diet and nutrition specialist (dietitian) to find the eating plan that will help to control your symptoms.  What are tips for following this plan?         · Keep a food diary. This will help you identify foods that cause symptoms. Write down:  ? What you eat and when you eat it.  ? What symptoms you have.  ? When symptoms occur in relation to your meals, such as \"pain in abdomen 2 hours after dinner.\"  · Eat your meals slowly and in a relaxed setting.  · Aim to eat 5-6 small meals per day. Do not skip meals.  · Drink enough fluid to keep your urine pale yellow.  · Ask your health care provider if you should take an over-the-counter probiotic to help restore healthy bacteria in your gut (digestive tract).  ? Probiotics are foods that contain good bacteria and yeasts.  · Your dietitian may have specific dietary recommendations for you based on your symptoms. He or she may recommend that you:  ? Avoid foods that cause symptoms. Talk with your dietitian about other ways to get the same nutrients that are in those problem foods.  ? Avoid foods with gluten. Gluten is a protein that is found in rye, wheat, and barley.  ? Eat more foods that contain soluble fiber. Examples of foods with high soluble fiber include oats, seeds, and certain fruits and vegetables. Take a fiber supplement if directed by your dietitian.  ? Reduce or avoid certain foods called FODMAPs. These are foods that contain " carbohydrates that are hard to digest. Ask your doctor which foods contain these carbohydrates.  What foods are not recommended?  The following are some foods and drinks that may make your symptoms worse:  · Fatty foods, such as french fries.  · Foods that contain gluten, such as pasta and cereal.  · Dairy products, such as milk, cheese, and ice cream.  · Chocolate.  · Alcohol.  · Products with caffeine, such as coffee.  · Carbonated drinks, such as soda.  · Foods that are high in FODMAPs. These include certain fruits and vegetables.  · Products with sweeteners such as honey, high fructose corn syrup, sorbitol, and mannitol.  The items listed above may not be a complete list of foods and beverages you should avoid. Contact a dietitian for more information.  What foods are good sources of fiber?  Your health care provider or dietitian may recommend that you eat more foods that contain fiber. Fiber can help to reduce constipation and other IBS symptoms. Add foods with fiber to your diet a little at a time so your body can get used to them. Too much fiber at one time might cause gas and swelling of your abdomen. The following are some foods that are good sources of fiber:  · Berries, such as raspberries, strawberries, and blueberries.  · Tomatoes.  · Carrots.  · Brown rice.  · Oats.  · Seeds, such as adore and pumpkin seeds.  The items listed above may not be a complete list of recommended sources of fiber. Contact your dietitian for more options.  Where to find more information  · International Foundation for Functional Gastrointestinal Disorders: www.iffgd.org  · National [place] of Diabetes and Digestive and Kidney Diseases: www.niddk.nih.gov  Summary  · When you have irritable bowel syndrome (IBS), it is very important to eat the foods and follow the eating habits that are best for your condition.  · IBS may cause various symptoms such as pain in the abdomen, constipation, or diarrhea.  · Choosing the right foods  can help to ease the discomfort that comes from symptoms.  · Keep a food diary. This will help you identify foods that cause symptoms.  · Your health care provider or diet and nutrition specialist (dietitian) may recommend that you eat more foods that contain fiber.  This information is not intended to replace advice given to you by your health care provider. Make sure you discuss any questions you have with your health care provider.  Document Revised: 04/08/2020 Document Reviewed: 08/21/2018  Elsevier Patient Education © 2021 [employer]

## 2021-03-25 ENCOUNTER — HOSPITAL ENCOUNTER (OUTPATIENT)
Dept: ULTRASOUND IMAGING | Facility: HOSPITAL | Age: 51
Discharge: HOME | End: 2021-03-25

## 2021-03-25 ENCOUNTER — OFFICE VISIT (OUTPATIENT)
Dept: OBSTETRICS AND GYNECOLOGY | Age: 51
End: 2021-03-25

## 2021-03-25 ENCOUNTER — HOSPITAL ENCOUNTER (OUTPATIENT)
Dept: MAMMOGRAPHY | Facility: HOSPITAL | Age: 51
Discharge: HOME | End: 2021-03-25

## 2021-03-25 VITALS
HEIGHT: 60 IN | BODY MASS INDEX: 31.22 KG/M2 | WEIGHT: 159 LBS | DIASTOLIC BLOOD PRESSURE: 74 MMHG | SYSTOLIC BLOOD PRESSURE: 124 MMHG

## 2021-03-25 DIAGNOSIS — N63.12 BREAST LUMP ON RIGHT SIDE AT 2 O'CLOCK POSITION: Primary | ICD-10-CM

## 2021-03-25 PROCEDURE — 76642 ULTRASOUND BREAST LIMITED: CPT

## 2021-03-25 PROCEDURE — 77066 DX MAMMO INCL CAD BI: CPT

## 2021-03-25 PROCEDURE — 99213 OFFICE O/P EST LOW 20 MIN: CPT | Performed by: PHYSICIAN ASSISTANT

## 2021-03-25 PROCEDURE — G0279 TOMOSYNTHESIS, MAMMO: HCPCS

## 2021-03-25 NOTE — PROGRESS NOTES
"Subjective     Chief Complaint   Patient presents with   • Breast Problem     right breast pain, may have found a lump,        Heidy Tesfaye is a 50 y.o.  whose LMP is No LMP recorded. Patient has had an ablation. presents with new finding of breast lump    She had mammogram done last year and additional w/u done after that that was wnl    Noted some pain in the right breast yesterday  Did a breast exam and thinks she feels something  Not consistent with breast exams usually    She is due for mammogram in April, had to r/s it from Feb  Last mammogram in 2020 required additional w/u but ultimate findings were wnl    Pt of Dr Rome    No Additional Complaints Reported    The following portions of the patient's history were reviewed and updated as appropriate:vital signs, allergies, current medications, past family history, past medical history, past social history, past surgical history and problem list      Review of Systems   Integument/breast: positive for breast lump and breast tenderness     Objective      /74   Ht 152.4 cm (60\")   Wt 72.1 kg (159 lb)   Breastfeeding No   BMI 31.05 kg/m²     Physical Exam    General:   alert, comfortable and no distress   Heart: Not performed today   Lungs: Not performed today.   Breast: Inspection negative, No nipple retraction or dimpling, No nipple discharge or bleeding, No axillary or supraclavicular adenopathy, positive findings: fibrocystic changes and 2 cm, round, rubbery and tender nodule located on the right 5 o'clock   Neck: na   Abdomen: {Not performed today   CVA: Not performed today   Pelvis: Not performed today   Extremities: Not performed today   Neurologic: negative   Psychiatric: Normal affect, judgement, and mood       Lab Review   Labs: No data reviewed     Imaging   No data reviewed    Assessment/Plan     ASSESSMENT  1. Breast lump on right side at 2 o'clock position        PLAN  1.   Orders Placed This Encounter   Procedures   • US " Breast Right Complete   • Mammo Diagnostic Bilateral With CAD       2. Breast imaging ordered. Await results to determine next step. She has routine mammogram scheduled in May, will likely need to r/s but address later. Can f/u as scheduled for annual on April 21st with Dr Rome    Follow up: 4 week(s)    ADELA Og  3/25/2021

## 2021-04-06 ENCOUNTER — BULK ORDERING (OUTPATIENT)
Dept: CASE MANAGEMENT | Facility: OTHER | Age: 51
End: 2021-04-06

## 2021-04-06 DIAGNOSIS — Z23 IMMUNIZATION DUE: ICD-10-CM

## 2021-04-16 ENCOUNTER — OFFICE VISIT (OUTPATIENT)
Dept: FAMILY MEDICINE CLINIC | Facility: CLINIC | Age: 51
End: 2021-04-16

## 2021-04-16 VITALS
BODY MASS INDEX: 31.22 KG/M2 | DIASTOLIC BLOOD PRESSURE: 86 MMHG | HEART RATE: 71 BPM | RESPIRATION RATE: 16 BRPM | SYSTOLIC BLOOD PRESSURE: 136 MMHG | TEMPERATURE: 98.2 F | WEIGHT: 159 LBS | HEIGHT: 60 IN | OXYGEN SATURATION: 97 %

## 2021-04-16 DIAGNOSIS — K58.0 IRRITABLE BOWEL SYNDROME WITH DIARRHEA: Primary | ICD-10-CM

## 2021-04-16 PROCEDURE — 99213 OFFICE O/P EST LOW 20 MIN: CPT | Performed by: NURSE PRACTITIONER

## 2021-04-16 NOTE — PROGRESS NOTES
"Subjective   Heidy Tesfaye is a 50 y.o. female.     History of Present Illness   Patient presents for follow-up for IBS, ongoing. She was started on a trial of ellavil and reports that she has been keeping a food diary to try to identify food that cause issues. She reports that her diarrhea has improved with medication, but that she is \"groggy\". She has scheduled appointment with GI in May and reports that her father was recently diagnosed with diverticulitis and had part of his colon removed.     The following portions of the patient's history were reviewed and updated as appropriate: allergies, current medications, past family history, past medical history, past social history, past surgical history and problem list.    Review of Systems   Constitutional: Negative for appetite change, chills, fatigue and fever.   Respiratory: Negative for cough, chest tightness, shortness of breath and wheezing.    Cardiovascular: Negative for chest pain, palpitations and leg swelling.   Gastrointestinal: Positive for diarrhea. Negative for abdominal distention, abdominal pain, anal bleeding, blood in stool, constipation, nausea, rectal pain, vomiting, GERD and indigestion.   Genitourinary: Negative.  Negative for flank pain.   Neurological: Negative for dizziness and headache.       Objective   Physical Exam  Vitals and nursing note reviewed.   Constitutional:       Appearance: She is well-developed.   HENT:      Head: Normocephalic and atraumatic.   Eyes:      Conjunctiva/sclera: Conjunctivae normal.      Pupils: Pupils are equal, round, and reactive to light.   Neck:      Thyroid: No thyromegaly.   Cardiovascular:      Rate and Rhythm: Normal rate and regular rhythm.      Heart sounds: Normal heart sounds. No murmur heard.     Pulmonary:      Effort: Pulmonary effort is normal.      Breath sounds: Normal breath sounds.   Abdominal:      General: Bowel sounds are normal. There is no distension.      Palpations: Abdomen is " soft. There is no mass.      Tenderness: There is abdominal tenderness (right lower ). There is no guarding or rebound.      Hernia: No hernia is present.   Musculoskeletal:      Cervical back: Normal range of motion and neck supple.   Lymphadenopathy:      Cervical: No cervical adenopathy.   Skin:     General: Skin is warm and dry.   Neurological:      Mental Status: She is alert and oriented to person, place, and time.   Psychiatric:         Behavior: Behavior normal.         Thought Content: Thought content normal.         Judgment: Judgment normal.         Vitals:    04/16/21 0851   BP: 136/86   Pulse: 71   Resp: 16   Temp: 98.2 °F (36.8 °C)   SpO2: 97%     Body mass index is 31.22 kg/m².    Procedures    Assessment/Plan   Problems Addressed this Visit     None      Diagnoses    None.       Continue food diary  Okay to take 1/2 ellavil and take 2 hours prior to bedtime.  Follow-up with GI as scheduled.        Return if symptoms worsen or fail to improve.     Patient Instructions   Continue food diary  Okay to take 1/2 ellavil and take 2 hours prior to bedtime.  Follow-up with GI as scheduled.   Return if symptoms worsen or fail to improve.

## 2021-04-16 NOTE — PATIENT INSTRUCTIONS
Continue food diary  Okay to take 1/2 ellavil and take 2 hours prior to bedtime.  Follow-up with GI as scheduled.   Return if symptoms worsen or fail to improve.

## 2021-04-21 ENCOUNTER — OFFICE VISIT (OUTPATIENT)
Dept: OBSTETRICS AND GYNECOLOGY | Age: 51
End: 2021-04-21

## 2021-04-21 VITALS
DIASTOLIC BLOOD PRESSURE: 78 MMHG | BODY MASS INDEX: 31.22 KG/M2 | SYSTOLIC BLOOD PRESSURE: 120 MMHG | WEIGHT: 159 LBS | HEIGHT: 60 IN

## 2021-04-21 DIAGNOSIS — Z01.419 ENCOUNTER FOR GYNECOLOGICAL EXAMINATION: Primary | ICD-10-CM

## 2021-04-21 DIAGNOSIS — Z79.890 HORMONE REPLACEMENT THERAPY: ICD-10-CM

## 2021-04-21 DIAGNOSIS — E55.9 VITAMIN D DEFICIENCY: ICD-10-CM

## 2021-04-21 PROCEDURE — 99396 PREV VISIT EST AGE 40-64: CPT | Performed by: OBSTETRICS & GYNECOLOGY

## 2021-04-21 RX ORDER — ERGOCALCIFEROL 1.25 MG/1
50000 CAPSULE ORAL
Qty: 12 CAPSULE | Refills: 3 | Status: SHIPPED | OUTPATIENT
Start: 2021-04-21 | End: 2022-05-09 | Stop reason: SDUPTHER

## 2021-04-21 RX ORDER — ESTRADIOL AND NORETHINDRONE ACETATE 1; .5 MG/1; MG/1
1 TABLET ORAL DAILY
Qty: 90 TABLET | Refills: 4 | Status: SHIPPED | OUTPATIENT
Start: 2021-04-21 | End: 2022-05-27

## 2021-04-21 NOTE — PROGRESS NOTES
Subjective     Chief Complaint   Patient presents with   • Gynecologic Exam     Annual:last pap ,mammo ,Diag.jaylene.3/21 for breast lump,rt.breast         History of Present Illness      Heidy Tesfaye is a very pleasant  50 y.o. female who presents for annual exam.  Mammo Exam  had a diagnostic bilateral mammogram and ultrasound which were read as normal., Contraception none patient's had an endometrial ablation in the past, Exercise 3 times a week    Patient's had some intermittent mastodynia, seems improved, she is no longer can feel an area of concern on her right breast, which she was able to do previously.  She has had a bilateral ultrasound and diagnostic mammogram both of which were read as normal.  She is on HRT    She has a history of very low vitamin D and is on supplements.    She is continue to work, emotionally and psychologically she seems to be in a much better spot this year than last couple visits.  Her daughters are in high school a different high schools.  She is receiving her wellness labs from her PCP and she reports they have been normal.  She had a colonoscopy this year found some polyps and she was uncertain when it need to be repeated but I asked her to check and advised her it would be likely 3 to 5 years.      Obstetric History:  OB History        2    Para   2    Term   2            AB        Living           SAB        TAB        Ectopic        Molar        Multiple        Live Births                   Menstrual History:     Patient's last menstrual period was 2020 (approximate).       Sexual History:       Past Medical History:   Diagnosis Date   • Allergic    • Anxiety    • Depression    • GERD (gastroesophageal reflux disease)    • Hyperglycemia    • Irregular menses    • Irritable bowel syndrome    • LGSIL on Pap smear of cervix     1998     Past Surgical History:   Procedure Laterality Date   • CARPAL TUNNEL RELEASE     • CRYOTHERAPY     •  "ENDOMETRIAL ABLATION     • WISDOM TOOTH EXTRACTION         SOCIAL Hx:      The following portions of the patient's history were reviewed and updated as appropriate: allergies, current medications, past family history, past medical history, past social history, past surgical history and problem list.    Review of Systems        Except as outlined in history of physical illness, patient denies any changes in her GYN, , GI systems.  All other systems reviewed were negative.         Current Outpatient Medications:   •  amitriptyline (ELAVIL) 25 MG tablet, Take 1 tablet by mouth Every Night., Disp: 30 tablet, Rfl: 3  •  estradiol-norethindrone (Activella) 1-0.5 MG per tablet, Take 1 tablet by mouth Daily. 1 by mouth daily, Disp: 90 tablet, Rfl: 4  •  vitamin D (ERGOCALCIFEROL) 1.25 MG (07400 UT) capsule capsule, Take 1 capsule by mouth Every 7 (Seven) Days., Disp: 12 capsule, Rfl: 3   Objective   Physical Exam    /78   Ht 152.4 cm (60\")   Wt 72.1 kg (159 lb)   LMP 09/01/2020 (Approximate)   Breastfeeding No   BMI 31.05 kg/m²     General: Patient is alert and oriented and appears overall healthy  Neck: Is supple without thyromegaly, no carotid bruits and no lymphadenopathy  Lungs: Clear bilaterally, no wheezing, rhonchi, or rales.  Respiratory rate is normal  Breast: Even, symmetrical, no lymphadenopathy, no retraction, no masses or cysts  Heart: Regular rate and rhythm are appreciated, no murmurs or rubs are heard  Abdomen: Is soft, without organomegaly, bowel sounds are positive, there is no                                rebound or guarding and palpation does not produce any discomfort  Back: Nontender without CVA tenderness  Pelvic: External genitalia appear normal and consistent with mature female.  BUS normal                            Vagina is clean dry without discharge and appears adequately estrogenized, no               lesions or masses are present                         Cervix is noninflamed " without discharge or lesions.  There is no cervical motion             tenderness.                Uterus is nonenlarged, without tenderness, and no masses or abnormalities are  present               Adnexa are non-enlarged, non tender               Rectal exam reveals adequate sphincter tone and no masses or lesions are                     appreciated on digital rectal examination.      Annual Well Woman Exam  Patient Active Problem List   Diagnosis   • Vitamin D deficiency, unspecified   • S/P endometrial ablation   • Anxiety   • Abdominal pain   • Acid reflux   • Adaptive colitis   • Cannot sleep   • Disease of thyroid gland   • Elevated WBC count   • Fatigue   • Myopia, bilateral   • Myopic astigmatism of both eyes   • Hormone replacement therapy                 Assessment/Plan   Diagnoses and all orders for this visit:    1. Encounter for gynecological examination (Primary)  -     IGP, Apt HPV,rfx 16 / 18,45    2. Vitamin D deficiency, unspecified    3. Hormone replacement therapy    Other orders  -     estradiol-norethindrone (Activella) 1-0.5 MG per tablet; Take 1 tablet by mouth Daily. 1 by mouth daily  Dispense: 90 tablet; Refill: 4  -     vitamin D (ERGOCALCIFEROL) 1.25 MG (61861 UT) capsule capsule; Take 1 capsule by mouth Every 7 (Seven) Days.  Dispense: 12 capsule; Refill: 3      Discussed today's findings and concerns with patient.  Continue to recommend regular exercise including cardiovascular and resistance training as well as  breast self-exam. Wellness lab, mammography, & pap smear, in accordance with age guidelines.    I have encouraged her to call for today's test results if she has not received them within 10 days.  Patient is advised to call with any change in her condition or with any other questions, otherwise return  for annual examination.

## 2021-04-23 LAB
CYTOLOGIST CVX/VAG CYTO: NORMAL
CYTOLOGY CVX/VAG DOC CYTO: NORMAL
CYTOLOGY CVX/VAG DOC THIN PREP: NORMAL
DX ICD CODE: NORMAL
HIV 1 & 2 AB SER-IMP: NORMAL
HPV I/H RISK 4 DNA CVX QL PROBE+SIG AMP: NEGATIVE
OTHER STN SPEC: NORMAL
STAT OF ADQ CVX/VAG CYTO-IMP: NORMAL

## 2021-06-29 ENCOUNTER — OFFICE (OUTPATIENT)
Dept: URBAN - METROPOLITAN AREA CLINIC 66 | Facility: CLINIC | Age: 51
End: 2021-06-29
Payer: COMMERCIAL

## 2021-06-29 VITALS
WEIGHT: 160 LBS | SYSTOLIC BLOOD PRESSURE: 120 MMHG | HEART RATE: 80 BPM | WEIGHT: 160 LBS | DIASTOLIC BLOOD PRESSURE: 98 MMHG | HEIGHT: 60 IN | SYSTOLIC BLOOD PRESSURE: 120 MMHG | HEIGHT: 60 IN | SYSTOLIC BLOOD PRESSURE: 120 MMHG | DIASTOLIC BLOOD PRESSURE: 98 MMHG | DIASTOLIC BLOOD PRESSURE: 98 MMHG | WEIGHT: 160 LBS | HEART RATE: 80 BPM | WEIGHT: 160 LBS | SYSTOLIC BLOOD PRESSURE: 120 MMHG | HEART RATE: 80 BPM | DIASTOLIC BLOOD PRESSURE: 98 MMHG | HEIGHT: 60 IN | HEART RATE: 80 BPM | HEIGHT: 60 IN | HEART RATE: 80 BPM | HEIGHT: 60 IN | DIASTOLIC BLOOD PRESSURE: 98 MMHG | WEIGHT: 160 LBS | SYSTOLIC BLOOD PRESSURE: 120 MMHG

## 2021-06-29 DIAGNOSIS — K21.9 GASTRO-ESOPHAGEAL REFLUX DISEASE WITHOUT ESOPHAGITIS: ICD-10-CM

## 2021-06-29 DIAGNOSIS — K52.9 NONINFECTIVE GASTROENTERITIS AND COLITIS, UNSPECIFIED: ICD-10-CM

## 2021-06-29 DIAGNOSIS — Z86.010 PERSONAL HISTORY OF COLONIC POLYPS: ICD-10-CM

## 2021-06-29 DIAGNOSIS — K58.0 IRRITABLE BOWEL SYNDROME WITH DIARRHEA: ICD-10-CM

## 2021-06-29 PROCEDURE — 99212 OFFICE O/P EST SF 10 MIN: CPT | Performed by: INTERNAL MEDICINE

## 2021-06-29 PROCEDURE — 99202 OFFICE O/P NEW SF 15 MIN: CPT | Performed by: INTERNAL MEDICINE

## 2021-06-29 RX ORDER — FAMOTIDINE 40 MG/1
40 TABLET, FILM COATED ORAL
Qty: 30 | Refills: 11 | Status: COMPLETED
Start: 2021-06-29 | End: 2021-11-15

## 2021-08-09 ENCOUNTER — OFFICE (OUTPATIENT)
Dept: URBAN - METROPOLITAN AREA CLINIC 66 | Facility: CLINIC | Age: 51
End: 2021-08-09

## 2021-08-09 VITALS
WEIGHT: 161 LBS | SYSTOLIC BLOOD PRESSURE: 120 MMHG | HEIGHT: 60 IN | HEART RATE: 86 BPM | DIASTOLIC BLOOD PRESSURE: 72 MMHG

## 2021-08-09 DIAGNOSIS — K58.0 IRRITABLE BOWEL SYNDROME WITH DIARRHEA: ICD-10-CM

## 2021-08-09 DIAGNOSIS — K21.9 GASTRO-ESOPHAGEAL REFLUX DISEASE WITHOUT ESOPHAGITIS: ICD-10-CM

## 2021-08-09 PROCEDURE — 99213 OFFICE O/P EST LOW 20 MIN: CPT | Performed by: NURSE PRACTITIONER

## 2021-08-09 RX ORDER — HYOSCYAMINE SULFATE 0.38 MG/1
0.75 TABLET, EXTENDED RELEASE ORAL
Qty: 60 | Refills: 11 | Status: COMPLETED
Start: 2021-08-09 | End: 2021-11-15

## 2021-11-15 ENCOUNTER — OFFICE (OUTPATIENT)
Dept: URBAN - METROPOLITAN AREA CLINIC 66 | Facility: CLINIC | Age: 51
End: 2021-11-15

## 2021-11-15 VITALS
HEART RATE: 73 BPM | HEIGHT: 60 IN | SYSTOLIC BLOOD PRESSURE: 132 MMHG | DIASTOLIC BLOOD PRESSURE: 81 MMHG | WEIGHT: 163 LBS

## 2021-11-15 DIAGNOSIS — K62.89 OTHER SPECIFIED DISEASES OF ANUS AND RECTUM: ICD-10-CM

## 2021-11-15 DIAGNOSIS — R19.7 DIARRHEA, UNSPECIFIED: ICD-10-CM

## 2021-11-15 PROCEDURE — 99213 OFFICE O/P EST LOW 20 MIN: CPT | Performed by: NURSE PRACTITIONER

## 2021-11-15 RX ORDER — AMITRIPTYLINE HYDROCHLORIDE 10 MG/1
10 TABLET, FILM COATED ORAL
Qty: 30 | Refills: 11 | Status: COMPLETED
Start: 2021-11-15 | End: 2022-01-07

## 2022-02-02 ENCOUNTER — OFFICE (OUTPATIENT)
Dept: URBAN - METROPOLITAN AREA CLINIC 66 | Facility: CLINIC | Age: 52
End: 2022-02-02

## 2022-02-02 VITALS
HEIGHT: 60 IN | SYSTOLIC BLOOD PRESSURE: 149 MMHG | HEART RATE: 80 BPM | WEIGHT: 163 LBS | DIASTOLIC BLOOD PRESSURE: 80 MMHG

## 2022-02-02 DIAGNOSIS — R19.7 DIARRHEA, UNSPECIFIED: ICD-10-CM

## 2022-02-02 DIAGNOSIS — K59.00 CONSTIPATION, UNSPECIFIED: ICD-10-CM

## 2022-02-02 PROCEDURE — 99214 OFFICE O/P EST MOD 30 MIN: CPT | Performed by: INTERNAL MEDICINE

## 2022-02-02 RX ORDER — COLESTIPOL HYDROCHLORIDE 1 G/1
2 TABLET, FILM COATED ORAL
Qty: 180 | Refills: 3 | Status: ACTIVE
Start: 2022-02-02

## 2022-02-24 ENCOUNTER — OFFICE (OUTPATIENT)
Dept: URBAN - METROPOLITAN AREA CLINIC 66 | Facility: CLINIC | Age: 52
End: 2022-02-24

## 2022-02-24 VITALS
SYSTOLIC BLOOD PRESSURE: 135 MMHG | HEART RATE: 82 BPM | DIASTOLIC BLOOD PRESSURE: 73 MMHG | WEIGHT: 162 LBS | HEIGHT: 60 IN

## 2022-02-24 DIAGNOSIS — K21.9 GASTRO-ESOPHAGEAL REFLUX DISEASE WITHOUT ESOPHAGITIS: ICD-10-CM

## 2022-02-24 DIAGNOSIS — R19.7 DIARRHEA, UNSPECIFIED: ICD-10-CM

## 2022-02-24 PROCEDURE — 99213 OFFICE O/P EST LOW 20 MIN: CPT | Performed by: NURSE PRACTITIONER

## 2022-02-24 RX ORDER — OMEPRAZOLE 20 MG/1
40 CAPSULE, DELAYED RELEASE ORAL
Qty: 90 | Refills: 3 | Status: COMPLETED
Start: 2022-02-24 | End: 2022-11-29

## 2022-02-24 RX ORDER — COLESTIPOL HYDROCHLORIDE 1 G/1
2 TABLET, FILM COATED ORAL
Qty: 180 | Refills: 3 | Status: COMPLETED
Start: 2022-02-24 | End: 2022-11-29

## 2022-05-09 ENCOUNTER — PROCEDURE VISIT (OUTPATIENT)
Dept: OBSTETRICS AND GYNECOLOGY | Age: 52
End: 2022-05-09

## 2022-05-09 ENCOUNTER — APPOINTMENT (OUTPATIENT)
Dept: WOMENS IMAGING | Facility: HOSPITAL | Age: 52
End: 2022-05-09

## 2022-05-09 DIAGNOSIS — Z12.31 VISIT FOR SCREENING MAMMOGRAM: Primary | ICD-10-CM

## 2022-05-09 PROCEDURE — 77063 BREAST TOMOSYNTHESIS BI: CPT | Performed by: RADIOLOGY

## 2022-05-09 PROCEDURE — 77067 SCR MAMMO BI INCL CAD: CPT | Performed by: RADIOLOGY

## 2022-05-09 PROCEDURE — 77063 BREAST TOMOSYNTHESIS BI: CPT | Performed by: OBSTETRICS & GYNECOLOGY

## 2022-05-09 PROCEDURE — 77067 SCR MAMMO BI INCL CAD: CPT | Performed by: OBSTETRICS & GYNECOLOGY

## 2022-05-09 RX ORDER — ERGOCALCIFEROL 1.25 MG/1
50000 CAPSULE ORAL
Qty: 12 CAPSULE | Refills: 3 | Status: SHIPPED | OUTPATIENT
Start: 2022-05-09

## 2022-05-17 DIAGNOSIS — R92.8 ABNORMAL MAMMOGRAM: Primary | ICD-10-CM

## 2022-05-20 ENCOUNTER — TELEPHONE (OUTPATIENT)
Dept: OBSTETRICS AND GYNECOLOGY | Age: 52
End: 2022-05-20

## 2022-05-20 NOTE — TELEPHONE ENCOUNTER
"Patient called to review Mammogram results. States that they were on her MyChart. Reviewed results, informed patient that Dr. Rome has not reviewed the results yet. Reviewed the \"Impression\" with the patient, let her know that a  would be calling her to schedule the appointment for the additional imaging. Patient stated understanding, no other questions at this time.  "

## 2022-05-23 ENCOUNTER — TELEPHONE (OUTPATIENT)
Dept: OBSTETRICS AND GYNECOLOGY | Age: 52
End: 2022-05-23

## 2022-05-23 DIAGNOSIS — R92.8 ABNORMAL MAMMOGRAM: Primary | ICD-10-CM

## 2022-05-23 NOTE — TELEPHONE ENCOUNTER
----- Message from Keegan Rome MD sent at 5/23/2022 12:53 PM EDT -----  Gertrude please let Heidy know I have reviewed imaging, there are areas that need further evaluation before they can give a final reading.  Please let her know how common this is and those orders have been placed we will discuss as soon as we get the results

## 2022-05-23 NOTE — PROGRESS NOTES
Gertrude please let Heidy know I have reviewed imaging, there are areas that need further evaluation before they can give a final reading.  Please let her know how common this is and those orders have been placed we will discuss as soon as we get the results

## 2022-05-25 ENCOUNTER — OFFICE (OUTPATIENT)
Dept: URBAN - METROPOLITAN AREA CLINIC 66 | Facility: CLINIC | Age: 52
End: 2022-05-25

## 2022-05-25 VITALS
WEIGHT: 162 LBS | HEART RATE: 88 BPM | DIASTOLIC BLOOD PRESSURE: 84 MMHG | HEIGHT: 60 IN | SYSTOLIC BLOOD PRESSURE: 132 MMHG

## 2022-05-25 DIAGNOSIS — K21.9 GASTRO-ESOPHAGEAL REFLUX DISEASE WITHOUT ESOPHAGITIS: ICD-10-CM

## 2022-05-25 DIAGNOSIS — K58.0 IRRITABLE BOWEL SYNDROME WITH DIARRHEA: ICD-10-CM

## 2022-05-25 DIAGNOSIS — R10.12 LEFT UPPER QUADRANT PAIN: ICD-10-CM

## 2022-05-25 PROCEDURE — 99213 OFFICE O/P EST LOW 20 MIN: CPT | Performed by: NURSE PRACTITIONER

## 2022-05-25 RX ORDER — OMEPRAZOLE 20 MG/1
40 CAPSULE, DELAYED RELEASE ORAL
Qty: 90 | Refills: 3 | Status: COMPLETED
Start: 2022-02-24 | End: 2022-11-29

## 2022-05-27 RX ORDER — ESTRADIOL AND NORETHINDRONE ACETATE 1; .5 MG/1; MG/1
TABLET ORAL
Qty: 84 TABLET | Refills: 3 | Status: SHIPPED | OUTPATIENT
Start: 2022-05-27 | End: 2022-06-06 | Stop reason: SDUPTHER

## 2022-06-06 ENCOUNTER — OFFICE VISIT (OUTPATIENT)
Dept: OBSTETRICS AND GYNECOLOGY | Age: 52
End: 2022-06-06

## 2022-06-06 VITALS
SYSTOLIC BLOOD PRESSURE: 120 MMHG | BODY MASS INDEX: 31.41 KG/M2 | WEIGHT: 160 LBS | DIASTOLIC BLOOD PRESSURE: 74 MMHG | HEIGHT: 60 IN

## 2022-06-06 DIAGNOSIS — Z11.51 SPECIAL SCREENING EXAMINATION FOR HUMAN PAPILLOMAVIRUS (HPV): ICD-10-CM

## 2022-06-06 DIAGNOSIS — Z00.00 ENCOUNTER FOR ANNUAL PHYSICAL EXAM: Primary | ICD-10-CM

## 2022-06-06 DIAGNOSIS — Z12.4 SCREENING FOR MALIGNANT NEOPLASM OF THE CERVIX: ICD-10-CM

## 2022-06-06 DIAGNOSIS — E55.9 VITAMIN D DEFICIENCY: ICD-10-CM

## 2022-06-06 DIAGNOSIS — Z79.890 HORMONE REPLACEMENT THERAPY: ICD-10-CM

## 2022-06-06 PROCEDURE — 99396 PREV VISIT EST AGE 40-64: CPT | Performed by: OBSTETRICS & GYNECOLOGY

## 2022-06-06 RX ORDER — ESTRADIOL AND NORETHINDRONE ACETATE 1; .5 MG/1; MG/1
1 TABLET ORAL DAILY
Qty: 84 TABLET | Refills: 3 | Status: SHIPPED | OUTPATIENT
Start: 2022-06-06 | End: 2023-01-25 | Stop reason: SDUPTHER

## 2022-06-06 NOTE — PROGRESS NOTES
Subjective     Chief Complaint   Patient presents with   • Gynecologic Exam     last pap 2021 neg, last mg 22 needs additional imaging    • Consult     Pt wants to discuss abn mg & menopause          History of Present Illness      Heidy Tesfaye is a very pleasant  51 y.o. female who presents for annual exam.  Mammo Exam follow-up is scheduled, Contraception yes, Exercise yes.      Obstetric History:  OB History        2    Para   2    Term   2            AB        Living           SAB        IAB        Ectopic        Molar        Multiple        Live Births                   Menstrual History:     No LMP recorded (lmp unknown). Patient has had an ablation.       Sexual History:       Past Medical History:   Diagnosis Date   • Allergic    • Anxiety    • Depression    • GERD (gastroesophageal reflux disease)    • Hyperglycemia    • Irregular menses    • Irritable bowel syndrome    • LGSIL on Pap smear of cervix     1998     Past Surgical History:   Procedure Laterality Date   • CARPAL TUNNEL RELEASE     • CRYOTHERAPY     • ENDOMETRIAL ABLATION     • WISDOM TOOTH EXTRACTION         SOCIAL Hx:      The following portions of the patient's history were reviewed and updated as appropriate: allergies, current medications, past family history, past medical history, past social history, past surgical history and problem list.    Review of Systems        Except as outlined in history of physical illness, patient denies any changes in her GYN, , GI systems.  All other systems reviewed were negative.         Current Outpatient Medications:   •  amitriptyline (ELAVIL) 25 MG tablet, Take 1 tablet by mouth Every Night., Disp: 30 tablet, Rfl: 3  •  estradiol-norethindrone (ACTIVELLA) 1-0.5 MG per tablet, TAKE 1 TABLET DAILY, Disp: 84 tablet, Rfl: 3  •  vitamin D (ERGOCALCIFEROL) 1.25 MG (39111 UT) capsule capsule, Take 1 capsule by mouth Every 7 (Seven) Days., Disp: 12 capsule, Rfl: 3   Objective  "  Physical Exam    /74   Ht 152.4 cm (60\")   Wt 72.6 kg (160 lb)   LMP  (LMP Unknown)   Breastfeeding No   BMI 31.25 kg/m²     General: Patient is alert and oriented and appears overall healthy  Neck: Is supple without thyromegaly, no carotid bruits and no lymphadenopathy  Lungs: Clear bilaterally, no wheezing, rhonchi, or rales.  Respiratory rate is normal  Breast: Even, symmetrical, no lymphadenopathy, no retraction, no masses or cysts  Heart: Regular rate and rhythm are appreciated, no murmurs or rubs are heard  Abdomen: Is soft, without organomegaly, bowel sounds are positive, there is no rebound or guarding and palpation does not produce any discomfort  Back: Nontender without CVA tenderness  Pelvic: External genitalia appear normal and consistent with mature female.  BUS normal                            Vagina is clean dry without discharge and appears adequately estrogenized, no lesions or masses are present                         Cervix is noninflamed without discharge or lesions.  There is no cervical motion tenderness.                Uterus is nonenlarged, without tenderness, and no masses or abnormalities are  present               Adnexa are non-enlarged, non tender               Rectal exam reveals adequate sphincter tone and no masses or lesions are appreciated on digital rectal examination.      Annual Well Woman Exam  Patient Active Problem List   Diagnosis   • Vitamin D deficiency, unspecified   • S/P endometrial ablation   • Anxiety   • Abdominal pain   • Acid reflux   • Adaptive colitis   • Cannot sleep   • Disease of thyroid gland   • Elevated WBC count   • Fatigue   • Myopia, bilateral   • Myopic astigmatism of both eyes   • Hormone replacement therapy                 Assessment & Plan   Diagnoses and all orders for this visit:    1. Encounter for annual physical exam (Primary)  -     CBC (No Diff); Future  -     Comprehensive Metabolic Panel; Future  -     Thyroid Cascade Profile; " Future  -     Lipid Panel; Future  -     Vitamin D 25 Hydroxy; Future  -     Follicle Stimulating Hormone; Future    2. Vitamin D deficiency  -     CBC (No Diff); Future  -     Comprehensive Metabolic Panel; Future  -     Thyroid Cascade Profile; Future  -     Lipid Panel; Future  -     Vitamin D 25 Hydroxy; Future  -     Follicle Stimulating Hormone; Future    3. Hormone replacement therapy    Patient has follow-up breast imaging scheduled.  Wellness labs with [facility] recheck vitamin D in the next couple days.      Discussed today's findings and concerns with patient.  Continue to recommend regular exercise including cardiovascular and resistance training as well as  breast self-exam. Wellness lab, mammography, & pap smear, in accordance with age guidelines.    I have encouraged her to call for today's test results if she has not received them within 10 days.  Patient is advised to call with any change in her condition or with any other questions, otherwise return  for annual examination.

## 2022-07-19 ENCOUNTER — TELEHEALTH PROVIDED IN PATIENT'S HOME (OUTPATIENT)
Dept: URBAN - METROPOLITAN AREA TELEHEALTH 5 | Facility: TELEHEALTH | Age: 52
End: 2022-07-19

## 2022-07-19 VITALS — HEIGHT: 60 IN

## 2022-07-19 DIAGNOSIS — R10.12 LEFT UPPER QUADRANT PAIN: ICD-10-CM

## 2022-07-19 DIAGNOSIS — K58.0 IRRITABLE BOWEL SYNDROME WITH DIARRHEA: ICD-10-CM

## 2022-07-19 PROCEDURE — 99213 OFFICE O/P EST LOW 20 MIN: CPT | Mod: 95 | Performed by: NURSE PRACTITIONER

## 2022-07-19 RX ORDER — FAMOTIDINE 40 MG/1
80 TABLET, FILM COATED ORAL
Qty: 180 | Refills: 3 | Status: COMPLETED
Start: 2022-07-19 | End: 2023-09-18

## 2022-07-22 ENCOUNTER — APPOINTMENT (OUTPATIENT)
Dept: WOMENS IMAGING | Facility: HOSPITAL | Age: 52
End: 2022-07-22

## 2022-07-22 PROCEDURE — 76641 ULTRASOUND BREAST COMPLETE: CPT | Performed by: RADIOLOGY

## 2022-07-22 PROCEDURE — 77065 DX MAMMO INCL CAD UNI: CPT | Performed by: RADIOLOGY

## 2022-07-22 PROCEDURE — 77061 BREAST TOMOSYNTHESIS UNI: CPT | Performed by: RADIOLOGY

## 2022-07-22 PROCEDURE — G0279 TOMOSYNTHESIS, MAMMO: HCPCS | Performed by: RADIOLOGY

## 2022-11-29 ENCOUNTER — OFFICE (OUTPATIENT)
Dept: URBAN - METROPOLITAN AREA CLINIC 66 | Facility: CLINIC | Age: 52
End: 2022-11-29

## 2022-11-29 VITALS
SYSTOLIC BLOOD PRESSURE: 137 MMHG | WEIGHT: 163 LBS | DIASTOLIC BLOOD PRESSURE: 80 MMHG | HEART RATE: 77 BPM | HEIGHT: 60 IN

## 2022-11-29 DIAGNOSIS — K58.0 IRRITABLE BOWEL SYNDROME WITH DIARRHEA: ICD-10-CM

## 2022-11-29 DIAGNOSIS — K30 FUNCTIONAL DYSPEPSIA: ICD-10-CM

## 2022-11-29 DIAGNOSIS — R10.12 LEFT UPPER QUADRANT PAIN: ICD-10-CM

## 2022-11-29 PROCEDURE — 99213 OFFICE O/P EST LOW 20 MIN: CPT | Performed by: INTERNAL MEDICINE

## 2022-11-29 RX ORDER — FAMOTIDINE 40 MG/1
80 TABLET, FILM COATED ORAL
Qty: 180 | Refills: 3 | Status: COMPLETED
Start: 2022-07-19 | End: 2023-09-18

## 2023-01-25 RX ORDER — ESTRADIOL AND NORETHINDRONE ACETATE 1; .5 MG/1; MG/1
1 TABLET ORAL DAILY
Qty: 84 TABLET | Refills: 3 | Status: SHIPPED | OUTPATIENT
Start: 2023-01-25

## 2023-01-25 NOTE — TELEPHONE ENCOUNTER
Caller: Heidy Tesfaye    Relationship: Self    Best call back number: [phone]    Requested Prescriptions:   Requested Prescriptions     Pending Prescriptions Disp Refills   • estradiol-norethindrone (ACTIVELLA) 1-0.5 MG per tablet 84 tablet 3     Sig: Take 1 tablet by mouth Daily.        Pharmacy where request should be sent: [facility] - [address] [phone] [facility] [phone]      Additional details provided by patient: PT GETS THEM IN 90 DAY SUPPLY SO SHE WILL NEED ENOUGH REFILLS FOR 6 MONTHS TO MAKE IT TO HER ANNUAL WHICH IS SCHEDULED FOR June 14TH.    Does the patient have less than a 3 day supply:  [] Yes  [x] No    Would you like a call back once the refill request has been completed: [x] Yes [] No    If the office needs to give you a call back, can they leave a voicemail: [x] Yes [] No    Brad Santoro Rep   01/25/23 09:24 EST

## 2023-04-04 ENCOUNTER — OFFICE (OUTPATIENT)
Dept: URBAN - METROPOLITAN AREA CLINIC 66 | Facility: CLINIC | Age: 53
End: 2023-04-04

## 2023-04-04 VITALS
DIASTOLIC BLOOD PRESSURE: 78 MMHG | HEART RATE: 72 BPM | SYSTOLIC BLOOD PRESSURE: 131 MMHG | WEIGHT: 162 LBS | HEIGHT: 60 IN

## 2023-04-04 DIAGNOSIS — R14.0 ABDOMINAL DISTENSION (GASEOUS): ICD-10-CM

## 2023-04-04 DIAGNOSIS — K21.9 GASTRO-ESOPHAGEAL REFLUX DISEASE WITHOUT ESOPHAGITIS: ICD-10-CM

## 2023-04-04 DIAGNOSIS — K58.0 IRRITABLE BOWEL SYNDROME WITH DIARRHEA: ICD-10-CM

## 2023-04-04 DIAGNOSIS — R11.0 NAUSEA: ICD-10-CM

## 2023-04-04 PROCEDURE — 99213 OFFICE O/P EST LOW 20 MIN: CPT | Performed by: NURSE PRACTITIONER

## 2023-04-04 RX ORDER — METRONIDAZOLE 500 MG/1
1500 TABLET, FILM COATED ORAL
Qty: 30 | Refills: 0 | Status: COMPLETED
Start: 2023-04-04 | End: 2023-07-18

## 2023-04-04 RX ORDER — ONDANSETRON 4 MG/1
12 TABLET, ORALLY DISINTEGRATING ORAL
Qty: 30 | Refills: 4 | Status: COMPLETED
Start: 2023-04-04 | End: 2023-07-18

## 2023-04-04 RX ORDER — FLUCONAZOLE 100 MG/1
100 TABLET ORAL
Qty: 10 | Refills: 1 | Status: COMPLETED
Start: 2023-04-04 | End: 2023-07-18

## 2023-06-14 ENCOUNTER — OFFICE VISIT (OUTPATIENT)
Dept: OBSTETRICS AND GYNECOLOGY | Age: 53
End: 2023-06-14
Payer: COMMERCIAL

## 2023-06-14 ENCOUNTER — PROCEDURE VISIT (OUTPATIENT)
Dept: OBSTETRICS AND GYNECOLOGY | Age: 53
End: 2023-06-14
Payer: COMMERCIAL

## 2023-06-14 VITALS
HEIGHT: 60 IN | SYSTOLIC BLOOD PRESSURE: 128 MMHG | WEIGHT: 161 LBS | DIASTOLIC BLOOD PRESSURE: 74 MMHG | BODY MASS INDEX: 31.61 KG/M2

## 2023-06-14 DIAGNOSIS — Z98.890 S/P ENDOMETRIAL ABLATION: ICD-10-CM

## 2023-06-14 DIAGNOSIS — Z12.31 VISIT FOR SCREENING MAMMOGRAM: Primary | ICD-10-CM

## 2023-06-14 DIAGNOSIS — E55.9 VITAMIN D DEFICIENCY: Primary | ICD-10-CM

## 2023-06-14 DIAGNOSIS — Z01.419 ENCOUNTER FOR GYNECOLOGICAL EXAMINATION: ICD-10-CM

## 2023-06-14 DIAGNOSIS — Z12.4 SCREENING FOR CERVICAL CANCER: ICD-10-CM

## 2023-06-14 DIAGNOSIS — F41.9 ANXIETY: ICD-10-CM

## 2023-06-14 DIAGNOSIS — Z12.31 BREAST CANCER SCREENING BY MAMMOGRAM: ICD-10-CM

## 2023-06-14 LAB — 25(OH)D3+25(OH)D2 SERPL-MCNC: 42.6 NG/ML (ref 30–100)

## 2023-06-14 RX ORDER — ESTRADIOL AND NORETHINDRONE ACETATE 1; .5 MG/1; MG/1
1 TABLET ORAL DAILY
Qty: 84 TABLET | Refills: 3 | Status: SHIPPED | OUTPATIENT
Start: 2023-06-14

## 2023-06-14 RX ORDER — ERGOCALCIFEROL 1.25 MG/1
50000 CAPSULE ORAL
Qty: 12 CAPSULE | Refills: 3 | Status: SHIPPED | OUTPATIENT
Start: 2023-06-14

## 2023-06-14 NOTE — PROGRESS NOTES
Subjective   Chief Complaint   Patient presents with    Gynecologic Exam     Annual:Last pap ,mammo here today,Discuss hormone levels from last year,, Needs refill on Activella and Vitamin D,uses [facility]      History of Present Illness  Wellness exam  Heidy Tesfaye is a very pleasant  52 y.o. female .  , Mammo Exam today, , Exercise 3-4 times a week    She was started on HRT last year, states she feels significantly better and would like to continue.    Has a history of vitamin D deficiency has not been taking her medicine recently    Can for a new PCP referral has been placed.    Obstetric History:  OB History          2    Para   2    Term   2            AB        Living             SAB        IAB        Ectopic        Molar        Multiple        Live Births                   Menstrual History:     No LMP recorded. Patient has had an ablation.       Sexual History:       Past Medical History:   Diagnosis Date    Allergic     Anxiety     Depression     GERD (gastroesophageal reflux disease)     Hyperglycemia     Irregular menses     Irritable bowel syndrome     LGSIL on Pap smear of cervix     1998     Past Surgical History:   Procedure Laterality Date    CARPAL TUNNEL RELEASE      CRYOTHERAPY      ENDOMETRIAL ABLATION      WISDOM TOOTH EXTRACTION         Current Outpatient Medications:     estradiol-norethindrone (ACTIVELLA) 1-0.5 MG per tablet, Take 1 tablet by mouth Daily., Disp: 84 tablet, Rfl: 3    vitamin D (ERGOCALCIFEROL) 1.25 MG (90628 UT) capsule capsule, Take 1 capsule by mouth Every 7 (Seven) Days., Disp: 12 capsule, Rfl: 3   SOCIAL Hx:  [unfilled]    The following portions of the patient's history were reviewed and updated as appropriate: allergies, current medications, past family history, past medical history, past social history, past surgical history and problem list.    Review of Systems      Urinary incontinence assessment discussed      Except as outlined in  "history of physical illness, patient denies any changes in her GYN, , GI systems.  All other systems reviewed are negative         Objective   Physical Exam    /74   Ht 152.4 cm (60\")   Wt 73 kg (161 lb)   BMI 31.44 kg/m²     General: Patient is alert and oriented and appears overall healthy  Neck: Is supple without thyromegaly, no carotid bruits and no lymphadenopathy  Lungs: Clear bilaterally, no wheezing, rhonchi, or rales.  Respiratory rate is normal  Breast: Even symmetrical, no lymphadenopathy, no retraction, no discharge ,no masses , lumps appreciated on either side  Heart: Regular rate and rhythm are appreciated, no murmurs or rubs are heard  Abdomen: Is soft, without organomegaly, bowel sounds are positive, there is no rebound or guarding and palpation does not produce any discomfort  Back: Nontender without CVA tenderness  Pelvic: External genitalia appear normal and consistent with mature female.  BUS normal                Urethra appears normal and without mass, bladder is nontender and without any lesions                        Urethral meatus is normal without scarring tenderness or masses                 Bladder is without tenderness or fullness                           Vagina is clean dry without discharge and , no lesions or masses are present                         Cervix is noninflamed without discharge or lesions.  There is no cervical motion tenderness.                Uterus is nonenlarged, without tenderness, and no masses or abnormalities are  present               Adnexa are non-enlarged, non tender               Rectal digital  exam reveals adequate sphincter tone and no masses or lesions are appreciated on digital rectal examination.       Patient Active Problem List   Diagnosis    Vitamin D deficiency, unspecified    S/P endometrial ablation    Anxiety    Abdominal pain    Acid reflux    Adaptive colitis    Cannot sleep    Disease of thyroid gland    Elevated WBC count    " Fatigue    Myopia, bilateral    Myopic astigmatism of both eyes    Hormone replacement therapy                Assessment & Plan   Diagnoses and all orders for this visit:    1. Vitamin D deficiency, unspecified (Primary)  -     IGP, Apt HPV,rfx 16 / 18,45  -     Vitamin D,25-Hydroxy    2. S/P endometrial ablation  -     IGP, Apt HPV,rfx 16 / 18,45    3. Anxiety  -     IGP, Apt HPV,rfx 16 / 18,45    4. Screening for cervical cancer  -     IGP, Apt HPV,rfx 16 / 18,45    5. Breast cancer screening by mammogram  -     IGP, Apt HPV,rfx 16 / 18,45    6. Encounter for gynecological examination  -     IGP, Apt HPV,rfx 16 / 18,45  -     Ambulatory Referral to Internal Medicine    Other orders  -     estradiol-norethindrone (ACTIVELLA) 1-0.5 MG per tablet; Take 1 tablet by mouth Daily.  Dispense: 84 tablet; Refill: 3  -     vitamin D (ERGOCALCIFEROL) 1.25 MG (09396 UT) capsule capsule; Take 1 capsule by mouth Every 7 (Seven) Days.  Dispense: 12 capsule; Refill: 3      Discussed today's findings and concerns with patient.  Continue to recommend regular exercise including cardiovascular and resistance training as well as  breast self-exam. Wellness lab, mammography, & pap smear, in accordance with age guidelines.    I have encouraged her to call for today's test results if she has not received them within 10 days.  Patient is advised to call with any change in her condition or with any other questions, otherwise return  for annual examination.

## 2023-07-10 ENCOUNTER — TELEPHONE (OUTPATIENT)
Dept: OBSTETRICS AND GYNECOLOGY | Age: 53
End: 2023-07-10

## 2023-07-10 NOTE — TELEPHONE ENCOUNTER
Caller: Heidy Tesfaye ALE    Relationship: Self    Best call back number: [phone]    Requested Prescriptions: DIFLUCAN   Requested Prescriptions      No prescriptions requested or ordered in this encounter        Pharmacy where request should be sent:  [facility] [address]    Last office visit with prescribing clinician: 6/14/2023   Last telemedicine visit with prescribing clinician: Visit date not found   Next office visit with prescribing clinician: 7/3/2024     Additional details provided by patient: PT HAS YEAST SYMPTOMS. VERY RAW AFTER TAKING AN ANTIBIOTIC AFTER SURGERY     Does the patient have less than a 3 day supply:  [x] Yes  [] No    Would you like a call back once the refill request has been completed: [x] Yes [] No    If the office needs to give you a call back, can they leave a voicemail: [x] Yes [] No    Brad Garcia Rep   07/10/23 09:42 EDT

## 2023-07-18 ENCOUNTER — OFFICE (OUTPATIENT)
Dept: URBAN - METROPOLITAN AREA CLINIC 66 | Facility: CLINIC | Age: 53
End: 2023-07-18

## 2023-07-18 VITALS
HEIGHT: 60 IN | DIASTOLIC BLOOD PRESSURE: 79 MMHG | HEART RATE: 76 BPM | WEIGHT: 162 LBS | SYSTOLIC BLOOD PRESSURE: 127 MMHG

## 2023-07-18 DIAGNOSIS — Z86.010 PERSONAL HISTORY OF COLONIC POLYPS: ICD-10-CM

## 2023-07-18 DIAGNOSIS — K21.9 GASTRO-ESOPHAGEAL REFLUX DISEASE WITHOUT ESOPHAGITIS: ICD-10-CM

## 2023-07-18 DIAGNOSIS — K58.9 IRRITABLE BOWEL SYNDROME WITHOUT DIARRHEA: ICD-10-CM

## 2023-07-18 DIAGNOSIS — R14.2 ERUCTATION: ICD-10-CM

## 2023-07-18 DIAGNOSIS — R15.2 FECAL URGENCY: ICD-10-CM

## 2023-07-18 PROCEDURE — 99213 OFFICE O/P EST LOW 20 MIN: CPT | Performed by: INTERNAL MEDICINE

## 2023-07-18 RX ORDER — SIMETHICONE 180 MG
720 CAPSULE ORAL
Qty: 120 | Refills: 5 | Status: COMPLETED
Start: 2023-07-18 | End: 2023-09-18

## 2023-07-18 RX ORDER — DICYCLOMINE HYDROCHLORIDE 20 MG/1
80 TABLET ORAL
Qty: 120 | Refills: 12 | Status: COMPLETED
Start: 2023-07-18 | End: 2023-09-18

## 2023-09-13 ENCOUNTER — OFFICE VISIT (OUTPATIENT)
Dept: INTERNAL MEDICINE | Facility: CLINIC | Age: 53
End: 2023-09-13
Payer: COMMERCIAL

## 2023-09-13 VITALS
OXYGEN SATURATION: 98 % | DIASTOLIC BLOOD PRESSURE: 92 MMHG | TEMPERATURE: 97.7 F | BODY MASS INDEX: 31.02 KG/M2 | WEIGHT: 158 LBS | HEART RATE: 101 BPM | HEIGHT: 60 IN | SYSTOLIC BLOOD PRESSURE: 130 MMHG

## 2023-09-13 DIAGNOSIS — Z13.1 SCREENING FOR DIABETES MELLITUS: ICD-10-CM

## 2023-09-13 DIAGNOSIS — Z13.220 SCREENING FOR HYPERLIPIDEMIA: ICD-10-CM

## 2023-09-13 DIAGNOSIS — Z00.00 ANNUAL PHYSICAL EXAM: Primary | ICD-10-CM

## 2023-09-13 DIAGNOSIS — Z11.59 NEED FOR HEPATITIS C SCREENING TEST: ICD-10-CM

## 2023-09-13 DIAGNOSIS — Z91.89 AT RISK FOR SLEEP APNEA: ICD-10-CM

## 2023-09-13 DIAGNOSIS — R53.83 OTHER FATIGUE: ICD-10-CM

## 2023-09-13 DIAGNOSIS — E66.09 CLASS 1 OBESITY DUE TO EXCESS CALORIES WITH SERIOUS COMORBIDITY AND BODY MASS INDEX (BMI) OF 30.0 TO 30.9 IN ADULT: ICD-10-CM

## 2023-09-13 DIAGNOSIS — Z11.4 ENCOUNTER FOR SCREENING FOR HIV: ICD-10-CM

## 2023-09-13 DIAGNOSIS — E78.2 MIXED HYPERLIPIDEMIA: ICD-10-CM

## 2023-09-13 NOTE — PROGRESS NOTES
Mata Ruggiero D.O.  Internal Medicine  [facility]  [address], [address]  [place] [address]  [phone]    Chief Complaint  Annual checkup    HISTORY    Heidy Tesfaye is a 52 y.o. female who presents to the office today as a  a new patient for their annual preventative exam.   Previously went to Alexa Ochoa NP with [facility] and also followed at one point with [facility].     No hospitalization(s) within the last year.     Current exercise regimen:  she walks 5 days weekly     Status of chronic medical conditions:    Obesity/Hyperlipidemia: not currently requiring medication; feels that she eats pretty healthy. States she did weight watchers years ago but everything she lost came back.    Post menopausal : prescribed estradiol-norethindrone as prescribed by GYN for postmenopausal symptoms     IBS-D: follows with Dr Roach gastroenterologist ;  previously on Bentyl and amitriptyline     Vitamin D deficiency : takes vitamin D 50,000 units weekly     GERD: follows with Dr Roach gastroenterologist . Takes Prevacid as needed    Situational anxiety: related to her work and kids     Patient's overall comments about their health or any other specific health concerns they report:    Pt states that she feels terrible every day. Feels that she is 89 years old. Feels that she never wakes up feeling rested. Her kids say that she snores.     GYN History:     *Patient's GYN Practice: [facility]    *post menopausal     *Previous pregnancies: 2.  Live births: 2.  Miscarriages: 0 . Elective abortions: 0.      Health Maintenance Summary            Overdue - BMI FOLLOWUP (Yearly) Overdue - never done      No completion history exists for this topic.              Ordered - HEPATITIS C SCREENING (Once) Ordered on 9/13/2023      No completion history exists for this topic.              Overdue - COVID-19 Vaccine (4 - Pfizer series) Overdue since 3/19/2022       01/22/2022  Imm Admin: COVID-19 (PFIZER) Purple Cap Monovalent    04/15/2021  Imm Admin: COVID-19 (PFIZER) Purple Cap Monovalent    03/26/2021  Imm Admin: COVID-19 (PFIZER) Purple Cap Monovalent              Overdue - ZOSTER VACCINE (1 of 2) Overdue - never done      No completion history exists for this topic.              Ordered - LIPID PANEL (Yearly) Ordered on 9/13/2023 06/09/2022  Lipid Panel    11/09/2020  LIPID PANEL    02/25/2020  Lipid Panel    07/08/2019  LIPID PANEL              INFLUENZA VACCINE (Yearly - October to March) Next due on 10/1/2023      11/27/2022  Imm Admin: Influenza Injectable Mdck Pf Quad    10/22/2020  Imm Admin: flucelvax quad pfs =>4 YRS    10/19/2020  Imm Admin: Flu Vaccine Split Quad    10/19/2020  Imm Admin: Fluzone (or Fluarix & Flulaval for VFC) >6mos    11/13/2019  Imm Admin: Fluzone (or Fluarix & Flulaval for VFC) >6mos    Only the first 5 history entries have been loaded, but more history exists.              ANNUAL PHYSICAL (Yearly) Next due on 9/13/2024 09/13/2023  Done    06/06/2022  Registry Metric: Last Annual Physical    03/18/2021  Done              MAMMOGRAM (Every 2 Years) Next due on 6/14/2025 06/14/2023  Mammo Screening Bilateral With CAD    07/22/2022  Mammo Diagnostic Right With CAD    05/09/2022  Mammo Screening Bilateral With CAD    03/25/2021  Mammo Diagnostic Digital Tomosynthesis Bilateral With CAD    03/16/2020  Mammo Diagnostic Right With CAD    Only the first 5 history entries have been loaded, but more history exists.              COLORECTAL CANCER SCREENING (COLONOSCOPY - Every 5 Years) Next due on 1/6/2026 01/06/2021  COLONOSCOPY (Done)    01/06/2021  Level of Service: [place] COLONOSCOPY W/BIOPSY SINGLE/MULTIPLE              PAP SMEAR (Every 3 Years) Next due on 6/14/2026 06/14/2023  IGP, Apt HPV,rfx 16 / 18,45    06/06/2022  IgP, Aptima HPV    04/21/2021  IGP, Apt HPV,rfx 16 / 18,45    02/25/2020  IgP, Aptima HPV    02/19/2019   IGP, Apt HPV,rfx 16 / 18,45    Only the first 5 history entries have been loaded, but more history exists.              TDAP/TD VACCINES (2 - Td or Tdap) Next due on 7/8/2029 07/08/2019  Imm Admin: Tdap              Pneumococcal Vaccine 0-64 (Series Information) Aged Out      No completion history exists for this topic.                     Allergies   Allergen Reactions    Hydrocodone-Acetaminophen Rash    Hydrocodone-Acetaminophen Hives, Rash and Other (See Comments)    Oxycodone-Acetaminophen Rash    Oxycodone-Acetaminophen Hives and Rash    Quinolones Rash        Outpatient Medications Marked as Taking for the 9/13/23 encounter (Office Visit) with Mata Ruggiero,    Medication Sig Dispense Refill    estradiol-norethindrone (ACTIVELLA) 1-0.5 MG per tablet Take 1 tablet by mouth Daily. 84 tablet 3    vitamin D (ERGOCALCIFEROL) 1.25 MG (83287 UT) capsule capsule Take 1 capsule by mouth Every 7 (Seven) Days. 12 capsule 3        Past Medical History:   Diagnosis Date    GERD (gastroesophageal reflux disease)     Hyperlipidemia     Irritable bowel syndrome     LGSIL on Pap smear of cervix     1/1998    Obesity     Post-menopausal     Situational anxiety     work, kids    Vitamin D deficiency      Past Surgical History:   Procedure Laterality Date    CARPAL TUNNEL RELEASE Right     CRYOTHERAPY      ENDOMETRIAL ABLATION      WISDOM TOOTH EXTRACTION       Family History   Problem Relation Age of Onset    Leukemia Mother     Heart valve disorder Mother     Coronary artery disease Mother     Diverticulitis Father     No Known Problems Brother     Pneumonia Maternal Grandmother     Pneumonia Maternal Grandfather     Heart disease Maternal Grandfather     Cancer Paternal Grandmother         leukemia    Kidney disease Paternal Grandmother     Heart failure Paternal Grandfather     BRCA 1/2 Neg Hx     Breast cancer Neg Hx     Colon cancer Neg Hx     Endometrial cancer Neg Hx     Ovarian cancer Neg Hx     reports that she  "has never smoked. She has never used smokeless tobacco. She reports current alcohol use. She reports that she does not use drugs.    Immunization History   Administered Date(s) Administered    COVID-19 (PFIZER) Purple Cap Monovalent 03/26/2021, 04/15/2021, 01/22/2022    Flu Vaccine Split Quad 10/25/2018, 10/19/2020    Fluzone (or Fluarix & Flulaval for VFC) >6mos 10/25/2018, 11/13/2019, 10/19/2020    Influenza Injectable Mdck Pf Quad 11/27/2022    Influenza Seasonal Injectable 10/28/2022    Tdap 07/08/2019    flucelvax quad pfs =>4 YRS 10/22/2020        OBJECTIVE    Vital Signs:   /92   Pulse 101   Temp 97.7 °F (36.5 °C) (Infrared)   Ht 152.4 cm (60\")   Wt 71.7 kg (158 lb)   SpO2 98%   BMI 30.86 kg/m²     Physical Exam  Vitals reviewed.   Constitutional:       General: She is not in acute distress.     Appearance: Normal appearance. She is obese. She is not ill-appearing.   HENT:      Head: Normocephalic and atraumatic.      Right Ear: Tympanic membrane, ear canal and external ear normal. There is no impacted cerumen.      Left Ear: Tympanic membrane, ear canal and external ear normal. There is no impacted cerumen.      Mouth/Throat:      Mouth: Mucous membranes are moist.      Pharynx: No oropharyngeal exudate or posterior oropharyngeal erythema.      Comments: Mallampati class III  Eyes:      General: No scleral icterus.     Extraocular Movements: Extraocular movements intact.      Conjunctiva/sclera: Conjunctivae normal.      Pupils: Pupils are equal, round, and reactive to light.   Cardiovascular:      Rate and Rhythm: Normal rate and regular rhythm.      Heart sounds: Normal heart sounds. No murmur heard.  Pulmonary:      Effort: Pulmonary effort is normal. No respiratory distress.      Breath sounds: Normal breath sounds. No wheezing.   Abdominal:      General: Bowel sounds are normal. There is no distension.      Palpations: Abdomen is soft.      Tenderness: There is no abdominal tenderness. There " is no guarding.   Musculoskeletal:      Cervical back: Neck supple. No tenderness.      Right lower leg: No edema.      Left lower leg: No edema.   Lymphadenopathy:      Cervical: No cervical adenopathy.   Skin:     General: Skin is warm and dry.      Coloration: Skin is not jaundiced.   Neurological:      General: No focal deficit present.      Mental Status: She is alert and oriented to person, place, and time.      Cranial Nerves: No cranial nerve deficit.      Motor: No weakness.   Psychiatric:         Mood and Affect: Mood normal.         Behavior: Behavior normal.         Thought Content: Thought content normal.                       ASSESSMENT & PLAN     #Annual Preventative Health Examination   -Age and sex appropriate physical exam performed and documented. Updated past medical, family, social and surgical histories as well as allergies and care team list. Addressed care gaps listed in the medical record.  -Encouraged annual dental and vision exams as part of their overall health.  -Encouraged minimum of 30 minutes or more of exercise at a brisk walk or higher 5 days per week combined with a well-balanced diet.  -Immunizations reviewed and updated in EMR.  The 10-year ASCVD risk score (David RUVALCABA, et al., 2019) is: 2.5%    Values used to calculate the score:      Age: 52 years      Sex: Female      Is Non- : No      Diabetic: No      Tobacco smoker: No      Systolic Blood Pressure: 130 mmHg      Is BP treated: No      HDL Cholesterol: 30 mg/dL      Total Cholesterol: 187 mg/dL   -Lipid screening:  known HLD, see plan below  -Aspirin for primary or secondary prevention: ASCVD risk calculate and aspirin for primary prevention is not indicated.  -Depression and Anxiety screening: PHQ2 performed and the patient's screen was negative.  -Diabetes screening:  Patient is 35-70 years of age and overweight, screening for diabetes is indicated every 3 years. Screening is not up to date and will  be updated today.   -Tobacco use screening: Patient denies cigarette use. Tobacco counseling was was not indicated.  -Alcohol use screening: Patient reports drinking 0-1 drinks per week.. Alcohol abuse counseling was was not indicated.  -Illicit drug screening: Patient does not use illicit drugs.  -Hypertension screening: + screening; Asked patient to check his BP 3-5 times weekly at random times at home after he has been seated for 5 minutes or longer.  Discussed normal BP of less than 120 systolic and 80 diastolic. If numbers are high he needs to report back to office for treatment. Discussed importance of identifying and treating high blood pressure in order to prevent future heart and kidney disease.   -HIV screening: will screen today  -Syphilis screening: Syphilis screening not indicated.  -Hepatitis B virus screening: Screening not indicated, not in a high-risk group.  -Hepatitis C virus screening:  will screen today  -Colon cancer screening: she reports colonoscopy 1/6/2021 with 5 year recall. Will attempt to obtain results.   -Lung cancer screening: Patient has never smoked.  -Cervical cancer screening:  Informed patient that the USPSTF recommends screening for cervical cancer every 3 years with cervical cytology alone in women aged 21 to 29 years. For women aged 30 to 65 years, the USPSTF recommends screening every 3 years with cervical cytology alone, every 5 years with high-risk human papillomavirus (hrHPV) testing alone, or every 5 years with HPV testing in combination with cytology (cotesting). Lasp pap : was normal with cotesting in 2023  -Breast cancer screening:  Was done approximately 6/2023 and the result was: Birads I (Normal)..  -BRCA related cancer screening: Informed patient that the USPSTF recommends that primary care clinicians assess women with a personal or family history of breast, ovarian, tubal, or peritoneal cancer or who have an ancestry associated with breast cancer susceptibility 1  and 2 (BRCA1/2) gene mutations with an appropriate brief familial risk assessment tool and referred to genetic counseling if risk assessment is positive. Patient does not have a known personal or family history.   -Osteoporosis screening: Informed patient that the USPSTF recommends screening for osteoporosis with bone measurement testing to prevent osteoporotic fractures in women 65 years and older. Screening is not applicable at this time.    Follow up in 1 year for annual physical exam.    Patient/family had no further questions at this time and verbalized understanding of the plan discussed today.     A problem-based visit was also conducted on the same day, see below for assessment and plan    Diagnoses and all orders for this visit:    1. Mixed hyperlipidemia (Primary)  2. Class 1 obesity due to excess calories with serious comorbidity and body mass index (BMI) of 30.0 to 30.9 in adult  Lab Results   Component Value Date    CHLPL 187 06/09/2022    TRIG 338 (H) 06/09/2022    HDL 30 (L) 06/09/2022     (H) 06/09/2022     -statin not indicated for primary prevention  BMI is >= 30 and <35. (Class 1 Obesity). The following options were offered after discussion;: exercise counseling/recommendations, nutrition counseling/recommendations, and pharmacological intervention options. Asked pt to check insurance coverage for Wegovy or Saxenda. Will have her follow up in 1 month and we can discuss pharmacologic treatment.     3. At risk for sleep apnea  4. Other fatigue  -Pt states that she feels terrible every day. Feels that she is 89 years old. Feels that she never wakes up feeling rested. Her kids say that she snores.   -this has been going on for several years  -will check CBC, CMP, TSH  -she has obesity as well as mallampati class III which does put her at risk for sleep apnea; discussed importance of identifying sleep apnea if present. Referral to sleep medicine placed today.  -depression screening  negative  -further plan depending on results of this workup  -     CBC w AUTO Differential  -     Comprehensive metabolic panel  -     TSH Rfx On Abnormal To Free T4  -     Ambulatory Referral to Sleep Medicine              The following social determinates of health impact the patient's medical decision making: No social determinates of health were factored in to today's visit.     Follow Up  Return in about 4 weeks (around 10/11/2023) for Recheck.

## 2023-09-14 LAB
ALBUMIN SERPL-MCNC: 5.2 G/DL (ref 3.5–5.2)
ALBUMIN/GLOB SERPL: 2.2 G/DL
ALP SERPL-CCNC: 82 U/L (ref 39–117)
ALT SERPL-CCNC: 22 U/L (ref 1–33)
AST SERPL-CCNC: 18 U/L (ref 1–32)
BASOPHILS # BLD AUTO: 0.04 10*3/MM3 (ref 0–0.2)
BASOPHILS NFR BLD AUTO: 0.7 % (ref 0–1.5)
BILIRUB SERPL-MCNC: 0.6 MG/DL (ref 0–1.2)
BUN SERPL-MCNC: 12 MG/DL (ref 6–20)
BUN/CREAT SERPL: 15.4 (ref 7–25)
CALCIUM SERPL-MCNC: 10.3 MG/DL (ref 8.6–10.5)
CHLORIDE SERPL-SCNC: 104 MMOL/L (ref 98–107)
CHOLEST SERPL-MCNC: 214 MG/DL (ref 0–200)
CO2 SERPL-SCNC: 26.1 MMOL/L (ref 22–29)
CREAT SERPL-MCNC: 0.78 MG/DL (ref 0.57–1)
EGFRCR SERPLBLD CKD-EPI 2021: 92.7 ML/MIN/1.73
EOSINOPHIL # BLD AUTO: 0.1 10*3/MM3 (ref 0–0.4)
EOSINOPHIL NFR BLD AUTO: 1.7 % (ref 0.3–6.2)
ERYTHROCYTE [DISTWIDTH] IN BLOOD BY AUTOMATED COUNT: 12.2 % (ref 12.3–15.4)
GLOBULIN SER CALC-MCNC: 2.4 GM/DL
GLUCOSE SERPL-MCNC: 109 MG/DL (ref 65–99)
HBA1C MFR BLD: 5.4 % (ref 4.8–5.6)
HCT VFR BLD AUTO: 39.7 % (ref 34–46.6)
HCV IGG SERPL QL IA: NON REACTIVE
HDLC SERPL-MCNC: 32 MG/DL (ref 40–60)
HGB BLD-MCNC: 14.3 G/DL (ref 12–15.9)
HIV 1+2 AB+HIV1 P24 AG SERPL QL IA: NON REACTIVE
IMM GRANULOCYTES # BLD AUTO: 0.02 10*3/MM3 (ref 0–0.05)
IMM GRANULOCYTES NFR BLD AUTO: 0.3 % (ref 0–0.5)
LDLC SERPL CALC-MCNC: 116 MG/DL (ref 0–100)
LDLC/HDLC SERPL: 3.31 {RATIO}
LYMPHOCYTES # BLD AUTO: 1.43 10*3/MM3 (ref 0.7–3.1)
LYMPHOCYTES NFR BLD AUTO: 24 % (ref 19.6–45.3)
MCH RBC QN AUTO: 31.6 PG (ref 26.6–33)
MCHC RBC AUTO-ENTMCNC: 36 G/DL (ref 31.5–35.7)
MCV RBC AUTO: 87.6 FL (ref 79–97)
MONOCYTES # BLD AUTO: 0.52 10*3/MM3 (ref 0.1–0.9)
MONOCYTES NFR BLD AUTO: 8.7 % (ref 5–12)
NEUTROPHILS # BLD AUTO: 3.85 10*3/MM3 (ref 1.7–7)
NEUTROPHILS NFR BLD AUTO: 64.6 % (ref 42.7–76)
NRBC BLD AUTO-RTO: 0 /100 WBC (ref 0–0.2)
PLATELET # BLD AUTO: 369 10*3/MM3 (ref 140–450)
POTASSIUM SERPL-SCNC: 4.2 MMOL/L (ref 3.5–5.2)
PROT SERPL-MCNC: 7.6 G/DL (ref 6–8.5)
RBC # BLD AUTO: 4.53 10*6/MM3 (ref 3.77–5.28)
SODIUM SERPL-SCNC: 140 MMOL/L (ref 136–145)
TRIGL SERPL-MCNC: 380 MG/DL (ref 0–150)
TSH SERPL DL<=0.005 MIU/L-ACNC: 1.31 UIU/ML (ref 0.27–4.2)
VLDLC SERPL CALC-MCNC: 66 MG/DL (ref 5–40)
WBC # BLD AUTO: 5.96 10*3/MM3 (ref 3.4–10.8)

## 2023-09-18 ENCOUNTER — OFFICE (OUTPATIENT)
Dept: URBAN - METROPOLITAN AREA CLINIC 66 | Facility: CLINIC | Age: 53
End: 2023-09-18

## 2023-09-18 VITALS
WEIGHT: 160 LBS | SYSTOLIC BLOOD PRESSURE: 121 MMHG | HEIGHT: 60 IN | HEART RATE: 84 BPM | DIASTOLIC BLOOD PRESSURE: 78 MMHG

## 2023-09-18 DIAGNOSIS — A04.9 BACTERIAL INTESTINAL INFECTION, UNSPECIFIED: ICD-10-CM

## 2023-09-18 DIAGNOSIS — K21.9 GASTRO-ESOPHAGEAL REFLUX DISEASE WITHOUT ESOPHAGITIS: ICD-10-CM

## 2023-09-18 DIAGNOSIS — K58.0 IRRITABLE BOWEL SYNDROME WITH DIARRHEA: ICD-10-CM

## 2023-09-18 PROCEDURE — 99213 OFFICE O/P EST LOW 20 MIN: CPT | Performed by: NURSE PRACTITIONER

## 2023-09-18 RX ORDER — OMEPRAZOLE 20 MG/1
40 CAPSULE, DELAYED RELEASE ORAL
Qty: 60 | Refills: 11 | Status: COMPLETED
Start: 2023-09-18 | End: 2023-12-18

## 2023-09-18 RX ORDER — FAMOTIDINE 40 MG/1
80 TABLET, FILM COATED ORAL
Qty: 180 | Refills: 3 | Status: COMPLETED
Start: 2022-07-19 | End: 2023-09-18

## 2023-09-18 RX ORDER — METRONIDAZOLE 500 MG/1
1500 TABLET, FILM COATED ORAL
Qty: 30 | Refills: 0 | Status: COMPLETED
Start: 2023-09-18 | End: 2023-12-18

## 2023-10-09 ENCOUNTER — OFFICE VISIT (OUTPATIENT)
Dept: SLEEP MEDICINE | Facility: HOSPITAL | Age: 53
End: 2023-10-09
Payer: COMMERCIAL

## 2023-10-09 VITALS
SYSTOLIC BLOOD PRESSURE: 155 MMHG | HEIGHT: 60 IN | OXYGEN SATURATION: 96 % | DIASTOLIC BLOOD PRESSURE: 82 MMHG | HEART RATE: 79 BPM | BODY MASS INDEX: 31.22 KG/M2 | WEIGHT: 159 LBS

## 2023-10-09 DIAGNOSIS — G47.10 HYPERSOMNIA: Primary | ICD-10-CM

## 2023-10-09 PROCEDURE — G0463 HOSPITAL OUTPT CLINIC VISIT: HCPCS

## 2023-10-09 NOTE — PROGRESS NOTES
CONSULT NOTE    Patient Identification:  Heidy Tesfaye  53 y.o.  female  1970  [id number]            Requesting physician: Mata Ruggiero DO    Reason for Consultation: Hypersomnia snoring    CC:     History of Present Illness:  Very pleasant 53-year-old female reports about 5 years ago she had a bad sinus infection and started snoring.  She reports feeling unrested in the morning sleepy tired as the day progresses.  Denies any witnessed apneas reported.  No heavy alcohol use reported.  No parasomnias such as sleepwalking sleep talking or restless leg symptoms reported.  Weight gain of 25 pounds reported.  Goes to bed 1030 gets up 630 gets about 7+ hours of sleep and still feels tired.  Also reports morning headache and grinding her teeth in her sleep.      Review of Systems  Positive for nasal congestion fatigue anxiety frequent heartburn abdominal bloating.  Rest of the 12 point review of system negative.  [place] 8 out of 24 within normal limits  Past Medical History:  Past Medical History:   Diagnosis Date    GERD (gastroesophageal reflux disease)     Hyperlipidemia     Irritable bowel syndrome     LGSIL on Pap smear of cervix     1/1998    Obesity     Post-menopausal     Situational anxiety     work, kids    Vitamin D deficiency        Past Surgical History:  Past Surgical History:   Procedure Laterality Date    CARPAL TUNNEL RELEASE Right     CRYOTHERAPY      ENDOMETRIAL ABLATION      WISDOM TOOTH EXTRACTION          Home Meds:  (Not in a hospital admission)      Allergies:  Allergies   Allergen Reactions    Hydrocodone-Acetaminophen Rash    Hydrocodone-Acetaminophen Hives, Rash and Other (See Comments)    Oxycodone-Acetaminophen Rash    Oxycodone-Acetaminophen Hives and Rash    Quinolones Rash       Social History:   Social History     Socioeconomic History    Marital status:     Number of children: 2   Tobacco Use    Smoking status: Never    Smokeless tobacco: Never   Vaping Use    Vaping Use:  "Never used   Substance and Sexual Activity    Alcohol use: Yes     Alcohol/week: 0.0 - 1.0 standard drinks of alcohol    Drug use: Never    Sexual activity: Yes     Partners: Male       Family History:  Family History   Problem Relation Age of Onset    Leukemia Mother     Heart valve disorder Mother     Coronary artery disease Mother     Diverticulitis Father     No Known Problems Brother     Pneumonia Maternal Grandmother     Pneumonia Maternal Grandfather     Heart disease Maternal Grandfather     Cancer Paternal Grandmother         leukemia    Kidney disease Paternal Grandmother     Heart failure Paternal Grandfather     BRCA 1/2 Neg Hx     Breast cancer Neg Hx     Colon cancer Neg Hx     Endometrial cancer Neg Hx     Ovarian cancer Neg Hx        Physical Exam:  /82   Pulse 79   Ht 152.4 cm (60\")   Wt 72.1 kg (159 lb)   SpO2 96%   BMI 31.05 kg/mý  Body mass index is 31.05 kg/mý. 96% 72.1 kg (159 lb)  Physical Exam  Patient is examined using the personal protective equipment as per guidelines from infection control for this particular patient as enacted.  Hand hygiene was performed before and after patient interaction.  Well-developed normal body habitus  Eyes normal conjunctivae and pupils reactive to light  ENT Mallampati between 3 and 4 normal nasal exam  Neck midline trachea no thyromegaly  Chest no labored breathing clear  CVS regular rate and rhythm no lower extremity edema  Abdomen soft nontender no hepatosplenomegaly  CNS intact normal sensory exam  Skin no rashes no nodules  Psych oriented to time place and person normal memory  Musculoskeletal no cyanosis no clubbing normal range of motion        LABS:  Lab Results   Component Value Date    CALCIUM 10.3 09/13/2023       No results found for: \"CKTOTAL\", \"CKMB\", \"CKMBINDEX\", \"TROPONINI\", \"TROPONINT\"                              Lab Results   Component Value Date    TSH 1.310 09/13/2023     Estimated Creatinine Clearance: 75.6 mL/min (by C-G " formula based on SCr of 0.78 mg/dL).         Imaging: I personally visualized the images of scans/x-rays performed within last 3 days.      Assessment:  Hypersomnia  Overweight  Snoring        Recommendations:  At this time we have a middle-aged female with airway anatomy and clinical symptoms strongly suggestive of sleep disordered breathing.  Discussed pathophysiology consequence of untreated sleep apnea.  Patient agreeable wishing to proceed for home sleep study.  Sleep hygiene measures discussed in detail.  Correlation between current symptoms and sleep apnea was also discussed in detail  Weight loss encouraged  We will follow-up after home sleep study to make further recommendations              Nahid Matute MD  10/9/2023  11:11 EDT      Much of this encounter note is an electronic transcription/translation of spoken language to printed text using Dragon Software.

## 2023-10-18 ENCOUNTER — OFFICE VISIT (OUTPATIENT)
Dept: INTERNAL MEDICINE | Facility: CLINIC | Age: 53
End: 2023-10-18
Payer: COMMERCIAL

## 2023-10-18 VITALS
BODY MASS INDEX: 31.8 KG/M2 | HEART RATE: 102 BPM | WEIGHT: 162 LBS | HEIGHT: 60 IN | OXYGEN SATURATION: 98 % | DIASTOLIC BLOOD PRESSURE: 80 MMHG | SYSTOLIC BLOOD PRESSURE: 110 MMHG

## 2023-10-18 DIAGNOSIS — E66.09 CLASS 1 OBESITY DUE TO EXCESS CALORIES WITH SERIOUS COMORBIDITY AND BODY MASS INDEX (BMI) OF 30.0 TO 30.9 IN ADULT: Primary | ICD-10-CM

## 2023-10-18 PROCEDURE — 99214 OFFICE O/P EST MOD 30 MIN: CPT | Performed by: STUDENT IN AN ORGANIZED HEALTH CARE EDUCATION/TRAINING PROGRAM

## 2023-10-18 NOTE — PROGRESS NOTES
"  Mata Ruggiero D.O.  Internal Medicine  [facility]  [address], [address]  [place] [address]  [phone]      Chief Complaint  Weight Loss (medication)    SUBJECTIVE    History of Present Illness    Heidy Tesfaye is a 53 y.o. female who presents to the office today as an established patient that last saw me on 9/13/2023.     Obesity: she walks 5-6 days weekly at a moderate pace; She doesn't follow any specific diet but tries to avoid fatty foods.   She would like to discuss Wegovy or Saxenda as these are covered by her insurance.    Allergies   Allergen Reactions    Hydrocodone-Acetaminophen Rash    Hydrocodone-Acetaminophen Hives, Rash and Other (See Comments)    Oxycodone-Acetaminophen Rash    Oxycodone-Acetaminophen Hives and Rash    Quinolones Rash        Outpatient Medications Marked as Taking for the 10/18/23 encounter (Office Visit) with Mata Ruggiero, DO   Medication Sig Dispense Refill    estradiol-norethindrone (ACTIVELLA) 1-0.5 MG per tablet Take 1 tablet by mouth Daily. 84 tablet 3    vitamin D (ERGOCALCIFEROL) 1.25 MG (52156 UT) capsule capsule Take 1 capsule by mouth Every 7 (Seven) Days. 12 capsule 3        Past Medical History:   Diagnosis Date    GERD (gastroesophageal reflux disease)     Hyperlipidemia     Irritable bowel syndrome     LGSIL on Pap smear of cervix     1/1998    Obesity     Post-menopausal     Situational anxiety     work, kids    Vitamin D deficiency        OBJECTIVE    Vital Signs:   /80   Pulse 102   Ht 152.4 cm (60\")   Wt 73.5 kg (162 lb)   SpO2 98%   BMI 31.64 kg/m²     Physical Exam  Vitals reviewed.   Constitutional:       General: She is not in acute distress.     Appearance: She is obese. She is not ill-appearing.   HENT:      Head: Atraumatic.   Eyes:      General: No scleral icterus.  Pulmonary:      Effort: Pulmonary effort is normal. No respiratory distress.   Skin:     Coloration: Skin is not jaundiced.   Neurological:      " Mental Status: She is alert.   Psychiatric:         Mood and Affect: Mood normal.         Behavior: Behavior normal.         Thought Content: Thought content normal.                             ASSESSMENT & PLAN     Diagnoses and all orders for this visit:    1. Class 1 obesity due to excess calories with serious comorbidity and body mass index (BMI) of 30.0 to 30.9 in adult (Primary)  -Patient seen today for initiation of pharmacological weight loss therapy. Patient advised of the alternatives to pharmacological weight loss therapy, including strict dieting and exercise, weight loss programs such as Weightwatchers, and mobile applications such as [employer] and [employer]. Advised patient that weight loss medication is most successful when in conjunction with diet and exercise.  -After discussing the alternatives, patient is interested in starting Wegovy.  -Patient’s current weight is 162 pounds and BMI is 31.6.   -Reviewed contraindications for Wegovy: Patients with a personal or family history or MTC or patients with MEN 2 or pregnancy  -Discussed the most common side effects , including nausea, diarrhea, constipation, vomiting, injection site reactions, headache, hypoglycemia, dyspepsia, fatigue, dizziness, abdominal pain  -Advised patient to report severe abdominal pain as Wegovy can cause pancreatitis or gallbladder disease.   -Counseled patient regarding the potential risk of MTC with use of Wegovy and informed them of symptoms of thyroid tumors (eg, a mass in the neck, dysphagia, dyspnea, persistent hoarseness). Report these if present.   -Counseled patient on strategies to limit nausea with Wegovy:    *Eat bland, low-fat foods, like crackers, toast, and rice   *Eat foods that contain water, like soups and gelatin   *Avoid lying down after they eat  -Educated patient on proper injection location. Directed patient to [employer] for full administration instructions and demonstration videos.   -Will begin Wegovy  titration  Week 1 through week 4 : 0.25 mg once weekly.  Week 5 through week 8: 0.5 mg once weekly.  Week 9 through week 12: 1 mg once weekly.  Week 13 through week 16: 1.7 mg once weekly.  Week 17 and thereafter (maintenance dosage): 2.4 mg once weekly  -Will follow up in 3-4 months for weight check. Plan to discontinue if at least 5% of baseline body weight loss has not been achieved at that time.           The following social determinates of health impact the patient's medical decision making: No social determinates of health were factored in to today's visit.     Follow Up  Return in about 4 months (around 2/18/2024) for Recheck.    Patient/family had no further questions at this time and verbalized understanding of the plan discussed today.

## 2023-10-19 ENCOUNTER — HOSPITAL ENCOUNTER (OUTPATIENT)
Dept: SLEEP MEDICINE | Facility: HOSPITAL | Age: 53
End: 2023-10-19
Payer: COMMERCIAL

## 2023-10-19 DIAGNOSIS — G47.10 HYPERSOMNIA: ICD-10-CM

## 2023-10-19 PROCEDURE — 95806 SLEEP STUDY UNATT&RESP EFFT: CPT

## 2023-11-02 DIAGNOSIS — Z12.31 VISIT FOR SCREENING MAMMOGRAM: Primary | ICD-10-CM

## 2023-11-03 ENCOUNTER — TELEPHONE (OUTPATIENT)
Dept: INTERNAL MEDICINE | Facility: CLINIC | Age: 53
End: 2023-11-03
Payer: COMMERCIAL

## 2023-11-03 NOTE — TELEPHONE ENCOUNTER
Caller: Heidy Tesfaye    Relationship to patient: Self    Best call back number: [phone]     Patient is needing: PATIENT CALLING, PRIOR AUTHORIZATION WENT THROUGH FOR WEGOVY, BUT IT IS ON BACK STOCK WITH A WAITING LIST CURRENTLY.  WOULD LIKE TO KNOW IF THERE IS ANYTHING ELSE TO TRY OR IF SAMPLES ARE AVAILABLE SO SHE CAN START THE MEDICATION.

## 2023-11-08 ENCOUNTER — TELEPHONE (OUTPATIENT)
Dept: SLEEP MEDICINE | Facility: HOSPITAL | Age: 53
End: 2023-11-08
Payer: COMMERCIAL

## 2023-11-14 ENCOUNTER — TELEPHONE (OUTPATIENT)
Dept: SLEEP MEDICINE | Facility: HOSPITAL | Age: 53
End: 2023-11-14
Payer: COMMERCIAL

## 2023-12-07 ENCOUNTER — TELEPHONE (OUTPATIENT)
Dept: INTERNAL MEDICINE | Facility: CLINIC | Age: 53
End: 2023-12-07
Payer: COMMERCIAL

## 2023-12-07 DIAGNOSIS — E66.09 CLASS 1 OBESITY DUE TO EXCESS CALORIES WITH SERIOUS COMORBIDITY AND BODY MASS INDEX (BMI) OF 30.0 TO 30.9 IN ADULT: Primary | ICD-10-CM

## 2023-12-07 NOTE — TELEPHONE ENCOUNTER
Please let pt know that many patients are having this issue. Zepbound is now available which is a medication in the same class with same side effects as Wegovy and Saxenda.   I recommend she try Zepbound. I sent the initial dosing to [facility] ([address]) so that I can monitor her progress more closely if she is OK with that. If not I can send it to another pharmacy. There is a savings card online , she can search Zepbound savings card. We will try to get this covered with her insurance.     Zepound is once weekly injection just like Wegovy. Keep refrigerated until she is ready to use.

## 2023-12-07 NOTE — TELEPHONE ENCOUNTER
Caller: Heidy Tesfaye    Relationship: Self    Best call back number: [phone]    What was the call regarding: PATIENT STATED SHE HAS CALLED SEVERAL PHARMACIES REGARDING THE WEGOVY AND SAXENDA MEDICATIONS, AND THEY ARE OUT OF STOCK.    SHE WOULD LIKE TO KNOW WHAT DR NELSON ADVISES.    PLEASE CALL.

## 2023-12-18 ENCOUNTER — OFFICE (OUTPATIENT)
Dept: URBAN - METROPOLITAN AREA CLINIC 66 | Facility: CLINIC | Age: 53
End: 2023-12-18

## 2023-12-18 VITALS
HEIGHT: 60 IN | HEART RATE: 92 BPM | WEIGHT: 160 LBS | DIASTOLIC BLOOD PRESSURE: 87 MMHG | SYSTOLIC BLOOD PRESSURE: 138 MMHG

## 2023-12-18 DIAGNOSIS — A04.9 BACTERIAL INTESTINAL INFECTION, UNSPECIFIED: ICD-10-CM

## 2023-12-18 DIAGNOSIS — K58.0 IRRITABLE BOWEL SYNDROME WITH DIARRHEA: ICD-10-CM

## 2023-12-18 DIAGNOSIS — K21.9 GASTRO-ESOPHAGEAL REFLUX DISEASE WITHOUT ESOPHAGITIS: ICD-10-CM

## 2023-12-18 PROCEDURE — 99214 OFFICE O/P EST MOD 30 MIN: CPT | Performed by: NURSE PRACTITIONER

## 2024-01-22 ENCOUNTER — OFFICE (OUTPATIENT)
Dept: URBAN - METROPOLITAN AREA CLINIC 66 | Facility: CLINIC | Age: 54
End: 2024-01-22

## 2024-01-22 VITALS
HEIGHT: 60 IN | DIASTOLIC BLOOD PRESSURE: 88 MMHG | WEIGHT: 162 LBS | SYSTOLIC BLOOD PRESSURE: 138 MMHG | HEART RATE: 80 BPM

## 2024-01-22 DIAGNOSIS — K21.9 GASTRO-ESOPHAGEAL REFLUX DISEASE WITHOUT ESOPHAGITIS: ICD-10-CM

## 2024-01-22 DIAGNOSIS — K59.00 CONSTIPATION, UNSPECIFIED: ICD-10-CM

## 2024-01-22 PROCEDURE — 99213 OFFICE O/P EST LOW 20 MIN: CPT | Performed by: NURSE PRACTITIONER

## 2024-01-22 RX ORDER — OMEPRAZOLE 20 MG/1
CAPSULE, DELAYED RELEASE ORAL
Qty: 180 | Refills: 3 | Status: ACTIVE
Start: 2024-01-22

## 2024-01-26 ENCOUNTER — TELEPHONE (OUTPATIENT)
Dept: OBSTETRICS AND GYNECOLOGY | Age: 54
End: 2024-01-26
Payer: COMMERCIAL

## 2024-01-26 NOTE — TELEPHONE ENCOUNTER
Pt is scheduled on Tuesday at 230, she can not come in at that time. She needs the morning or another day.  She is being seen for a bump that she needs him to look at.  She does not want to see a NP/PA so where can you fit her in?

## 2024-01-30 ENCOUNTER — OFFICE VISIT (OUTPATIENT)
Dept: OBSTETRICS AND GYNECOLOGY | Age: 54
End: 2024-01-30
Payer: COMMERCIAL

## 2024-01-30 VITALS
DIASTOLIC BLOOD PRESSURE: 70 MMHG | HEIGHT: 60 IN | SYSTOLIC BLOOD PRESSURE: 124 MMHG | BODY MASS INDEX: 31.02 KG/M2 | WEIGHT: 158 LBS

## 2024-01-30 DIAGNOSIS — N90.7 VULVAR CYST: Primary | ICD-10-CM

## 2024-01-30 RX ORDER — OMEPRAZOLE 20 MG/1
20 CAPSULE, DELAYED RELEASE ORAL
COMMUNITY
Start: 2024-01-22

## 2024-01-30 RX ORDER — CEPHALEXIN 500 MG/1
500 CAPSULE ORAL 3 TIMES DAILY
Qty: 15 CAPSULE | Refills: 0 | Status: SHIPPED | OUTPATIENT
Start: 2024-01-30 | End: 2024-02-04

## 2024-02-01 NOTE — PROGRESS NOTES
Subjective       History of Present Illness  Heidy Tesfaye is a 53 y.o. female is being seen today for an area of on her vulva that has been concerning.  Patient has previously had an endometrial ablation.  She noticed an area on her vulva several days ago and wanted to be evaluated  Chief Complaint   Patient presents with    Gynecologic Exam     Gyn problem: Vaginal bump,noticed after shaving with Vogt hair removal   .        The following portions of the patient's history were reviewed and updated as appropriate: allergies, current medications, past family history, past medical history, past social history, past surgical history and problem list.    PAST MEDICAL HISTORY  Past Medical History:   Diagnosis Date    GERD (gastroesophageal reflux disease)     Hyperlipidemia     Irritable bowel syndrome     LGSIL on Pap smear of cervix     1998    Obesity     Post-menopausal     Situational anxiety     work, kids    Vitamin D deficiency      OB History    Para Term  AB Living   2 2 2         SAB IAB Ectopic Molar Multiple Live Births                    # Outcome Date GA Lbr Gabriel/2nd Weight Sex Delivery Anes PTL Lv   2 Term            1 Term              Past Surgical History:   Procedure Laterality Date    CARPAL TUNNEL RELEASE Right     CRYOTHERAPY      ENDOMETRIAL ABLATION      WISDOM TOOTH EXTRACTION       Family History   Problem Relation Age of Onset    Leukemia Mother     Heart valve disorder Mother     Coronary artery disease Mother     Diverticulitis Father     No Known Problems Brother     Pneumonia Maternal Grandmother     Pneumonia Maternal Grandfather     Heart disease Maternal Grandfather     Cancer Paternal Grandmother         leukemia    Kidney disease Paternal Grandmother     Heart failure Paternal Grandfather     BRCA 1/2 Neg Hx     Breast cancer Neg Hx     Colon cancer Neg Hx     Endometrial cancer Neg Hx     Ovarian cancer Neg Hx      Social History     Tobacco Use   Smoking Status  Never   Smokeless Tobacco Never       Current Outpatient Medications:     estradiol-norethindrone (ACTIVELLA) 1-0.5 MG per tablet, Take 1 tablet by mouth Daily., Disp: 84 tablet, Rfl: 3    omeprazole (priLOSEC) 20 MG capsule, 1 capsule., Disp: , Rfl:     vitamin D (ERGOCALCIFEROL) 1.25 MG (88641 UT) capsule capsule, Take 1 capsule by mouth Every 7 (Seven) Days., Disp: 12 capsule, Rfl: 3    cephalexin (Keflex) 500 MG capsule, Take 1 capsule by mouth 3 (Three) Times a Day for 5 days., Disp: 15 capsule, Rfl: 0    Semaglutide-Weight Management 0.25 MG/0.5ML solution auto-injector, Inject 0.25 mg under the skin into the appropriate area as directed 1 (One) Time Per Week. (Patient not taking: Reported on 1/30/2024), Disp: 2 mL, Rfl: 0    Tirzepatide-Weight Management (ZEPBOUND) 2.5 MG/0.5ML solution auto-injector, Inject 0.5 mL under the skin into the appropriate area as directed 1 (One) Time Per Week. (Patient not taking: Reported on 1/30/2024), Disp: 2 mL, Rfl: 0  Immunization History   Administered Date(s) Administered    COVID-19 (PFIZER) Purple Cap Monovalent 03/26/2021, 04/15/2021, 01/22/2022    Flu Vaccine Split Quad 10/25/2018, 10/19/2020    Fluzone (or Fluarix & Flulaval for VFC) >6mos 10/25/2018, 11/13/2019, 10/19/2020    Influenza Injectable Mdck Pf Quad 11/27/2022    Influenza Seasonal Injectable 10/28/2022    Tdap 07/08/2019    flucelvax quad pfs =>4 YRS 10/22/2020       Review of Systems       Except as outlined in history of physical illness, patient denies any changes in her GYN, , GI systems. All other systems reviewed are negative.    Objective   Physical Exam   Alert and oriented, respirations unlabored, heart regular rate and rhythm   Pelvic external genitalia normal female with a small fluctuant vulvar cyst on the labia minora.  It is approximately 1 and half centimeters, there is no erythema there is no discharge minimal discomfort      Assessment & Plan   Diagnoses and all orders for this  visit:    1. Vulvar cyst (Primary)    Other orders  -     cephalexin (Keflex) 500 MG capsule; Take 1 capsule by mouth 3 (Three) Times a Day for 5 days.  Dispense: 15 capsule; Refill: 0    Will also use antibacterial soap and some warm compresses, if not completely resolved she will call and we can always excise             No orders of the defined types were placed in this encounter.          EMR Dragon/ Transcription disclaimer:  Much of the encounter note is an electronic transcription/translation of spoken language to printed text. The electronic translation of spoken language may permit erroneous, or at times, nonessential words or phrases to be inadvertently transcribes; Although i have reviewed the note for such errors, some may still exist.

## 2024-03-15 ENCOUNTER — OFFICE (OUTPATIENT)
Dept: URBAN - METROPOLITAN AREA CLINIC 65 | Facility: CLINIC | Age: 54
End: 2024-03-15

## 2024-03-15 VITALS
WEIGHT: 162 LBS | DIASTOLIC BLOOD PRESSURE: 80 MMHG | SYSTOLIC BLOOD PRESSURE: 138 MMHG | HEART RATE: 82 BPM | HEIGHT: 60 IN

## 2024-03-15 DIAGNOSIS — Z86.010 PERSONAL HISTORY OF COLONIC POLYPS: ICD-10-CM

## 2024-03-15 DIAGNOSIS — K21.9 GASTRO-ESOPHAGEAL REFLUX DISEASE WITHOUT ESOPHAGITIS: ICD-10-CM

## 2024-03-15 DIAGNOSIS — K58.0 IRRITABLE BOWEL SYNDROME WITH DIARRHEA: ICD-10-CM

## 2024-03-15 PROCEDURE — 99214 OFFICE O/P EST MOD 30 MIN: CPT | Performed by: INTERNAL MEDICINE

## 2024-03-15 RX ORDER — ONDANSETRON HYDROCHLORIDE 4 MG/1
8 TABLET, FILM COATED ORAL
Qty: 180 | Refills: 3 | Status: ACTIVE
Start: 2024-03-15

## 2024-06-12 RX ORDER — ESTRADIOL AND NORETHINDRONE ACETATE 1; .5 MG/1; MG/1
1 TABLET ORAL DAILY
Qty: 84 TABLET | Refills: 3 | Status: SHIPPED | OUTPATIENT
Start: 2024-06-12

## 2024-06-12 RX ORDER — ERGOCALCIFEROL 1.25 MG/1
50000 CAPSULE ORAL
Qty: 12 CAPSULE | Refills: 3 | Status: SHIPPED | OUTPATIENT
Start: 2024-06-12

## 2024-07-03 ENCOUNTER — OFFICE VISIT (OUTPATIENT)
Dept: OBSTETRICS AND GYNECOLOGY | Age: 54
End: 2024-07-03
Payer: COMMERCIAL

## 2024-07-03 ENCOUNTER — HOSPITAL ENCOUNTER (OUTPATIENT)
Facility: HOSPITAL | Age: 54
Discharge: HOME | End: 2024-07-03
Admitting: OBSTETRICS & GYNECOLOGY
Payer: COMMERCIAL

## 2024-07-03 VITALS
SYSTOLIC BLOOD PRESSURE: 120 MMHG | DIASTOLIC BLOOD PRESSURE: 72 MMHG | WEIGHT: 163 LBS | BODY MASS INDEX: 32 KG/M2 | HEIGHT: 60 IN

## 2024-07-03 DIAGNOSIS — E55.9 VITAMIN D DEFICIENCY: ICD-10-CM

## 2024-07-03 DIAGNOSIS — Z01.419 ENCOUNTER FOR GYNECOLOGICAL EXAMINATION: Primary | ICD-10-CM

## 2024-07-03 DIAGNOSIS — Z12.31 BREAST CANCER SCREENING BY MAMMOGRAM: ICD-10-CM

## 2024-07-03 DIAGNOSIS — Z12.31 VISIT FOR SCREENING MAMMOGRAM: ICD-10-CM

## 2024-07-03 DIAGNOSIS — Z71.89 COUNSELING FOR HORMONE REPLACEMENT THERAPY: ICD-10-CM

## 2024-07-03 DIAGNOSIS — Z12.4 SCREENING FOR CERVICAL CANCER: ICD-10-CM

## 2024-07-03 PROCEDURE — 77067 SCR MAMMO BI INCL CAD: CPT

## 2024-07-03 PROCEDURE — 77063 BREAST TOMOSYNTHESIS BI: CPT

## 2024-07-03 RX ORDER — ESTRADIOL 2 MG/1
2 TABLET ORAL
COMMUNITY
End: 2024-07-03

## 2024-07-03 RX ORDER — ESTRADIOL AND NORETHINDRONE ACETATE 1; .5 MG/1; MG/1
1 TABLET ORAL DAILY
Qty: 84 TABLET | Refills: 3 | Status: SHIPPED | OUTPATIENT
Start: 2024-07-03 | End: 2024-07-05 | Stop reason: SDUPTHER

## 2024-07-03 RX ORDER — ERGOCALCIFEROL 1.25 MG/1
50000 CAPSULE ORAL
Qty: 12 CAPSULE | Refills: 3 | Status: SHIPPED | OUTPATIENT
Start: 2024-07-03 | End: 2024-07-05 | Stop reason: SDUPTHER

## 2024-07-03 NOTE — PROGRESS NOTES
Subjective   Chief Complaint   Patient presents with    Gynecologic Exam     Annual:last pap ,mammo today,colonoscopy ,discuss lump on left labia,occasional cramping,no bleeding      History of Present Illness  Wellness exam  Heidy Tesfaye is a very pleasant  53 y.o. female .  , Mammo Exam today, , Exercise yes and I encouraged her to continue with her program  Patient had a trial of semaglutide did not tolerate that very well she is receiving all of her wellness labs including vitamin D this September  She is very happy on the HRT tolerating that well risk benefits reviewed  .    Obstetric History:  OB History          2    Para   2    Term   2            AB        Living             SAB        IAB        Ectopic        Molar        Multiple        Live Births                   Menstrual History:     No LMP recorded (lmp unknown). Patient has had an ablation.       Sexual History:       Past Medical History:   Diagnosis Date    GERD (gastroesophageal reflux disease)     Hyperlipidemia     Irritable bowel syndrome     LGSIL on Pap smear of cervix     1998    Obesity     Post-menopausal     Situational anxiety     work, kids    Vitamin D deficiency      Past Surgical History:   Procedure Laterality Date    CARPAL TUNNEL RELEASE Right     CRYOTHERAPY      ENDOMETRIAL ABLATION      WISDOM TOOTH EXTRACTION         Current Outpatient Medications:     estradiol-norethindrone (ACTIVELLA) 1-0.5 MG per tablet, Take 1 tablet by mouth Daily., Disp: 84 tablet, Rfl: 3    omeprazole (priLOSEC) 20 MG capsule, 1 capsule., Disp: , Rfl:     vitamin D (ERGOCALCIFEROL) 1.25 MG (68975 UT) capsule capsule, Take 1 capsule by mouth Every 7 (Seven) Days., Disp: 12 capsule, Rfl: 3   SOCIAL Hx:  [unfilled]    The following portions of the patient's history were reviewed and updated as appropriate: allergies, current medications, past family history, past medical history, past social history, past surgical history  "and problem list.    Review of Systems      Urinary incontinence assessment discussed      Except as outlined in history of physical illness, patient denies any changes in her GYN, , GI systems.  All other systems reviewed are negative         Objective   Physical Exam    /72   Ht 152.4 cm (60\")   Wt 73.9 kg (163 lb)   LMP  (LMP Unknown)   BMI 31.83 kg/m²     General: Patient is alert and oriented and appears overall healthy  Neck: Is supple without thyromegaly, no carotid bruits and no lymphadenopathy  Lungs: Clear bilaterally, no wheezing, rhonchi, or rales.  Respiratory rate is normal  Breast: Even symmetrical, no lymphadenopathy, no retraction, no discharge ,no masses or lumps appreciated on either side  Heart: Regular rate and rhythm are appreciated, no murmurs or rubs are heard  Abdomen: Is soft, without organomegaly, bowel sounds are positive, there is no rebound or guarding and palpation does not produce any discomfort  Back: Nontender without CVA tenderness  Pelvic: External genitalia appear normal and consistent with mature female.  BUS normal                Urethra appears normal and without mass, bladder is nontender and without any lesions                        Urethral meatus is normal without scarring tenderness or masses                 Bladder is without tenderness or fullness                           Vagina is clean dry without discharge and , no lesions or masses are present                         Cervix is noninflamed without discharge or lesions.  There is no cervical motion tenderness.                Uterus is nonenlarged, without tenderness, and no masses or abnormalities are  present               Adnexa are non-enlarged, non tender               Rectal digital  exam reveals adequate sphincter tone and no masses or lesions are appreciated on digital rectal examination.       Patient Active Problem List   Diagnosis    Vitamin D deficiency, unspecified    S/P endometrial ablation "    Anxiety    Abdominal pain    Acid reflux    Adaptive colitis    Cannot sleep    Disease of thyroid gland    Elevated WBC count    Fatigue    Myopia, bilateral    Myopic astigmatism of both eyes    Hormone replacement therapy                Assessment & Plan   Diagnoses and all orders for this visit:    1. Encounter for gynecological examination (Primary)  -     IGP, Apt HPV,rfx 16 / 18,45    2. Vitamin D deficiency, unspecified       Patient has vitamin D deficiency, she has been followed with her PCP as far as lab work and I stressed the importance of making sure that this checked in a couple months time to see whether she needs to continue the medication or not  3. Breast cancer screening by mammogram  -     Mammo Screening Digital Tomosynthesis Bilateral With CAD; Future    4. Screening for cervical cancer      Normal-appearing cervix and pelvic exam Pap smear performed  5. Counseling for hormone replacement therapy       Patient tolerating HRT well would like to continue risk benefits reviewed  Other orders  -     estradiol-norethindrone (ACTIVELLA) 1-0.5 MG per tablet; Take 1 tablet by mouth Daily.  Dispense: 84 tablet; Refill: 3  -     vitamin D (ERGOCALCIFEROL) 1.25 MG (99731 UT) capsule capsule; Take 1 capsule by mouth Every 7 (Seven) Days.  Dispense: 12 capsule; Refill: 3      Discussed today's findings and concerns with patient.  Continue to recommend regular exercise including cardiovascular and resistance training as well as  breast self-exam. Wellness lab, mammography, & pap smear, in accordance with age guidelines.    I have encouraged her to call for today's test results if she has not received them within 10 days.  Patient is advised to call with any change in her condition or with any other questions, otherwise return  for annual examination.

## 2024-07-05 RX ORDER — ESTRADIOL AND NORETHINDRONE ACETATE 1; .5 MG/1; MG/1
1 TABLET ORAL DAILY
Qty: 84 TABLET | Refills: 3 | Status: SHIPPED | OUTPATIENT
Start: 2024-07-05

## 2024-07-05 RX ORDER — ERGOCALCIFEROL 1.25 MG/1
50000 CAPSULE ORAL
Qty: 12 CAPSULE | Refills: 3 | Status: SHIPPED | OUTPATIENT
Start: 2024-07-05

## 2024-07-08 LAB
CYTOLOGIST CVX/VAG CYTO: NORMAL
CYTOLOGY CVX/VAG DOC CYTO: NORMAL
CYTOLOGY CVX/VAG DOC THIN PREP: NORMAL
DX ICD CODE: NORMAL
HPV I/H RISK 4 DNA CVX QL PROBE+SIG AMP: NEGATIVE
Lab: NORMAL
OTHER STN SPEC: NORMAL
STAT OF ADQ CVX/VAG CYTO-IMP: NORMAL

## 2024-09-20 NOTE — TELEPHONE ENCOUNTER
Pt.will call C and schedule her follow up appt.for her additional views needed from her mammogram.   no

## 2024-10-18 ENCOUNTER — OFFICE (OUTPATIENT)
Age: 54
End: 2024-10-18

## 2024-10-18 ENCOUNTER — OFFICE (OUTPATIENT)
Dept: URBAN - METROPOLITAN AREA CLINIC 65 | Facility: CLINIC | Age: 54
End: 2024-10-18

## 2024-10-18 VITALS
HEART RATE: 92 BPM | DIASTOLIC BLOOD PRESSURE: 72 MMHG | HEART RATE: 92 BPM | WEIGHT: 168 LBS | SYSTOLIC BLOOD PRESSURE: 118 MMHG | SYSTOLIC BLOOD PRESSURE: 118 MMHG | OXYGEN SATURATION: 98 % | WEIGHT: 168 LBS | WEIGHT: 168 LBS | SYSTOLIC BLOOD PRESSURE: 118 MMHG | WEIGHT: 168 LBS | OXYGEN SATURATION: 98 % | HEIGHT: 60 IN | DIASTOLIC BLOOD PRESSURE: 72 MMHG | HEART RATE: 92 BPM | SYSTOLIC BLOOD PRESSURE: 118 MMHG | HEART RATE: 92 BPM | OXYGEN SATURATION: 98 % | HEIGHT: 60 IN | DIASTOLIC BLOOD PRESSURE: 72 MMHG | OXYGEN SATURATION: 98 % | WEIGHT: 168 LBS | HEART RATE: 92 BPM | HEIGHT: 60 IN | HEIGHT: 60 IN | OXYGEN SATURATION: 98 % | HEIGHT: 60 IN | SYSTOLIC BLOOD PRESSURE: 118 MMHG | HEIGHT: 60 IN | SYSTOLIC BLOOD PRESSURE: 118 MMHG | WEIGHT: 168 LBS | DIASTOLIC BLOOD PRESSURE: 72 MMHG | WEIGHT: 168 LBS | DIASTOLIC BLOOD PRESSURE: 72 MMHG | DIASTOLIC BLOOD PRESSURE: 72 MMHG | HEART RATE: 92 BPM | DIASTOLIC BLOOD PRESSURE: 72 MMHG | HEIGHT: 60 IN | OXYGEN SATURATION: 98 % | OXYGEN SATURATION: 98 % | SYSTOLIC BLOOD PRESSURE: 118 MMHG | HEART RATE: 92 BPM

## 2024-10-18 DIAGNOSIS — R10.12 LEFT UPPER QUADRANT PAIN: ICD-10-CM

## 2024-10-18 DIAGNOSIS — K21.9 GASTRO-ESOPHAGEAL REFLUX DISEASE WITHOUT ESOPHAGITIS: ICD-10-CM

## 2024-10-18 DIAGNOSIS — A04.9 BACTERIAL INTESTINAL INFECTION, UNSPECIFIED: ICD-10-CM

## 2024-10-18 DIAGNOSIS — K58.2 MIXED IRRITABLE BOWEL SYNDROME: ICD-10-CM

## 2024-10-18 PROCEDURE — 99214 OFFICE O/P EST MOD 30 MIN: CPT | Performed by: INTERNAL MEDICINE

## 2024-10-18 RX ORDER — LUBIPROSTONE 8 UG/1
16 CAPSULE, GELATIN COATED ORAL
Qty: 180 | Refills: 4 | Status: COMPLETED
Start: 2024-08-23 | End: 2024-10-18

## 2024-10-18 RX ORDER — LUBIPROSTONE 24 UG/1
24 CAPSULE, GELATIN COATED ORAL
Qty: 90 | Refills: 3 | Status: ACTIVE
Start: 2024-10-18 | End: 2024-10-18

## 2025-05-27 ENCOUNTER — TELEPHONE (OUTPATIENT)
Dept: OBSTETRICS AND GYNECOLOGY | Age: 55
End: 2025-05-27
Payer: COMMERCIAL

## 2025-05-27 RX ORDER — ERGOCALCIFEROL 1.25 MG/1
50000 CAPSULE, LIQUID FILLED ORAL
Qty: 12 CAPSULE | Refills: 3 | Status: SHIPPED | OUTPATIENT
Start: 2025-05-27

## 2025-05-27 RX ORDER — ESTRADIOL AND NORETHINDRONE ACETATE 1; .5 MG/1; MG/1
1 TABLET ORAL DAILY
Qty: 84 TABLET | Refills: 3 | Status: SHIPPED | OUTPATIENT
Start: 2025-05-27

## 2025-07-09 ENCOUNTER — HOSPITAL ENCOUNTER (OUTPATIENT)
Facility: HOSPITAL | Age: 55
Discharge: HOME | End: 2025-07-09
Admitting: OBSTETRICS & GYNECOLOGY
Payer: COMMERCIAL

## 2025-07-09 ENCOUNTER — OFFICE VISIT (OUTPATIENT)
Dept: OBSTETRICS AND GYNECOLOGY | Age: 55
End: 2025-07-09
Payer: COMMERCIAL

## 2025-07-09 VITALS
DIASTOLIC BLOOD PRESSURE: 64 MMHG | SYSTOLIC BLOOD PRESSURE: 120 MMHG | WEIGHT: 168 LBS | BODY MASS INDEX: 32.98 KG/M2 | HEIGHT: 60 IN

## 2025-07-09 DIAGNOSIS — Z12.4 SCREENING FOR CERVICAL CANCER: ICD-10-CM

## 2025-07-09 DIAGNOSIS — Z12.31 BREAST CANCER SCREENING BY MAMMOGRAM: ICD-10-CM

## 2025-07-09 DIAGNOSIS — E55.9 VITAMIN D DEFICIENCY: ICD-10-CM

## 2025-07-09 DIAGNOSIS — Z12.31 SCREENING MAMMOGRAM FOR BREAST CANCER: Primary | ICD-10-CM

## 2025-07-09 DIAGNOSIS — Z01.419 ENCOUNTER FOR GYNECOLOGICAL EXAMINATION: Primary | ICD-10-CM

## 2025-07-09 DIAGNOSIS — Z71.89 COUNSELING FOR HORMONE REPLACEMENT THERAPY: ICD-10-CM

## 2025-07-09 PROCEDURE — 77067 SCR MAMMO BI INCL CAD: CPT

## 2025-07-09 PROCEDURE — 77063 BREAST TOMOSYNTHESIS BI: CPT

## 2025-07-09 RX ORDER — ERGOCALCIFEROL 1.25 MG/1
50000 CAPSULE, LIQUID FILLED ORAL
Qty: 12 CAPSULE | Refills: 3 | Status: SHIPPED | OUTPATIENT
Start: 2025-07-09 | End: 2025-07-10 | Stop reason: SDUPTHER

## 2025-07-09 RX ORDER — ESTRADIOL AND NORETHINDRONE ACETATE 1; .5 MG/1; MG/1
1 TABLET ORAL DAILY
Qty: 84 TABLET | Refills: 3 | Status: SHIPPED | OUTPATIENT
Start: 2025-07-09 | End: 2025-07-10 | Stop reason: SDUPTHER

## 2025-07-09 RX ORDER — ROSUVASTATIN CALCIUM 5 MG/1
5 TABLET, COATED ORAL DAILY
COMMUNITY
Start: 2024-11-04 | End: 2025-07-09

## 2025-07-09 NOTE — PROGRESS NOTES
Subjective   Chief Complaint   Patient presents with    Gynecologic Exam     Annual:last pap ,mammo today, Discuss wt.gain.sleep issues      History of Present Illness  Wellness exam  Heidy Tesfaye is a very pleasant  54 y.o. female .  , Mammo Exam today, , Exercise doing well on the cardio and walking but needs to add some resistance we discussed this in detail.  Heidy is happy on her HRT, symptoms are much improved anxiety is decreased work and life stressors have slightly decreased however she still struggling with losing weight.  She is scheduled to see a new PCP.  They had initially recommended Ozempic but she was not able to tolerate that.  She is up-to-date on her colonoscopy, Pap smear and mammogram are being performed today.  She has a history of vitamin D deficiency and that is being followed with her PCP wellness labs.  She would like to continue HRT..    Obstetric History:  OB History          2    Para   2    Term   2            AB        Living             SAB        IAB        Ectopic        Molar        Multiple        Live Births                   Menstrual History:     No LMP recorded (lmp unknown). Patient has had an ablation.       Sexual History:       Past Medical History:   Diagnosis Date    GERD (gastroesophageal reflux disease)     Hyperlipidemia     Irritable bowel syndrome     LGSIL on Pap smear of cervix     1998    Obesity     Post-menopausal     Situational anxiety     work, kids    Vitamin D deficiency      Past Surgical History:   Procedure Laterality Date    CARPAL TUNNEL RELEASE Right     CRYOTHERAPY      ENDOMETRIAL ABLATION      WISDOM TOOTH EXTRACTION         Current Outpatient Medications:     estradiol-norethindrone (ACTIVELLA) 1-0.5 MG per tablet, Take 1 tablet by mouth Daily., Disp: 84 tablet, Rfl: 3    omeprazole (priLOSEC) 20 MG capsule, Take 1 capsule by mouth 2 (Two) Times a Day Before Meals., Disp: 180 capsule, Rfl: 1    ondansetron ODT  "(ZOFRAN-ODT) 4 MG disintegrating tablet, Place 1 tablet under the tongue 3 (Three) Times a Day As Needed for nausea and vomiting., Disp: 270 tablet, Rfl: 1    valACYclovir (Valtrex) 1000 MG tablet, Take 1 tablet by mouth Daily., Disp: 30 tablet, Rfl: 2    vitamin D (ERGOCALCIFEROL) 1.25 MG (51078 UT) capsule capsule, Take 1 capsule by mouth Every 7 (Seven) Days., Disp: 12 capsule, Rfl: 3   SOCIAL Hx:  [unfilled]    The following portions of the patient's history were reviewed and updated as appropriate: allergies, current medications, past family history, past medical history, past social history, past surgical history and problem list.    Review of Systems      Urinary incontinence assessment discussed      Except as outlined in history of physical illness, patient denies any changes in her GYN, , GI systems.  All other systems reviewed are negative      REVIEW OF SYSTEMS:   Constitutional: Oriented, no unexpected weight loss/gain, or fatigue   Cardiovascular: No chest pain, irregular heart beat   Respiratory: No wheezing or shortness of breath   Gastrointestinal: No nausea, vomiting, constipation, diarrhea, bloody stool   Genitourinary: No pelvic pain or incontinence   Musculoskeletal: No muscle pain or swollen joints   Skin/Breast: No rash or change in mole(s), no breast pain or lumps   Psychiatric: No depression or anxiety        Objective   Physical Exam    /64   Ht 152.4 cm (60\")   Wt 76.2 kg (168 lb)   LMP  (LMP Unknown)   BMI 32.81 kg/m²     General: Patient is alert and oriented and appears overall healthy  Neck: Is supple without thyromegaly, no carotid bruits and no lymphadenopathy  Lungs: Clear bilaterally, no wheezing, rhonchi, or rales.  Respiratory rate is normal  Breast: Even symmetrical, no lymphadenopathy, no retraction, no discharge ,no masses or lumps appreciated on either side  Heart: Regular rate and rhythm are appreciated, no murmurs or rubs are heard  Abdomen: Is soft, without " organomegaly, bowel sounds are positive, there is no rebound or guarding and palpation does not produce any discomfort  Back: Nontender without CVA tenderness  Pelvic: External genitalia appear normal and consistent with mature female.  BUS normal                Urethra appears normal and without mass, bladder is nontender and without any lesions                        Urethral meatus is normal without scarring tenderness or masses                 Bladder is without tenderness or fullness                           Vagina is clean dry without discharge and , no lesions or masses are present                         Cervix is noninflamed without discharge or lesions.  There is no cervical motion tenderness.                Uterus is nonenlarged, without tenderness, and no masses or abnormalities are  present               Adnexa are non-enlarged, non tender               Rectal digital  exam reveals adequate sphincter tone and no masses or lesions are appreciated on digital rectal examination.       Patient Active Problem List   Diagnosis    Vitamin D deficiency, unspecified    S/P endometrial ablation    Anxiety    Abdominal pain    Acid reflux    Adaptive colitis    Cannot sleep    Disease of thyroid gland    Elevated WBC count    Fatigue    Myopia, bilateral    Myopic astigmatism of both eyes    Hormone replacement therapy                Assessment & Plan   Diagnoses and all orders for this visit:    1. Encounter for gynecological examination (Primary)  -     IGP, Apt HPV,rfx 16 / 18,45    2. Vitamin D deficiency, unspecified    3. Counseling for hormone replacement therapy    4. Breast cancer screening by mammogram    5. Screening for cervical cancer    Other orders  -     estradiol-norethindrone (ACTIVELLA) 1-0.5 MG per tablet; Take 1 tablet by mouth Daily.  Dispense: 84 tablet; Refill: 3  -     vitamin D (ERGOCALCIFEROL) 1.25 MG (86142 UT) capsule capsule; Take 1 capsule by mouth Every 7 (Seven) Days.  Dispense:  12 capsule; Refill: 3       Discussed today's findings and concerns with patient.  Continue to recommend regular exercise including cardiovascular and resistance training as well as  breast self-exam. Wellness lab, mammography, & pap smear, in accordance with age guidelines.    I have encouraged her to call for today's test results if she has not received them within 10 days.  Patient is advised to call with any change in her condition or with any other questions, otherwise return  for annual examination.

## 2025-07-10 ENCOUNTER — TELEPHONE (OUTPATIENT)
Dept: OBSTETRICS AND GYNECOLOGY | Age: 55
End: 2025-07-10
Payer: COMMERCIAL

## 2025-07-10 RX ORDER — ERGOCALCIFEROL 1.25 MG/1
50000 CAPSULE, LIQUID FILLED ORAL
Qty: 12 CAPSULE | Refills: 3 | Status: SHIPPED | OUTPATIENT
Start: 2025-07-10

## 2025-07-10 RX ORDER — ESTRADIOL AND NORETHINDRONE ACETATE 1; .5 MG/1; MG/1
1 TABLET ORAL DAILY
Qty: 84 TABLET | Refills: 3 | Status: SHIPPED | OUTPATIENT
Start: 2025-07-10

## 2025-07-10 NOTE — TELEPHONE ENCOUNTER
Patient called, she would like her prescriptions that were sent in yesterday [facility]  [facility], to be re-sent to the [facility] in her chart. It is for Vitamin D and Estradiol.

## 2025-07-11 LAB
CYTOLOGIST CVX/VAG CYTO: NORMAL
CYTOLOGY CVX/VAG DOC CYTO: NORMAL
CYTOLOGY CVX/VAG DOC THIN PREP: NORMAL
DX ICD CODE: NORMAL
HPV I/H RISK 4 DNA CVX QL PROBE+SIG AMP: NEGATIVE
OTHER STN SPEC: NORMAL
SERVICE CMNT-IMP: NORMAL
STAT OF ADQ CVX/VAG CYTO-IMP: NORMAL